# Patient Record
Sex: FEMALE | Race: WHITE | NOT HISPANIC OR LATINO | Employment: FULL TIME | URBAN - METROPOLITAN AREA
[De-identification: names, ages, dates, MRNs, and addresses within clinical notes are randomized per-mention and may not be internally consistent; named-entity substitution may affect disease eponyms.]

---

## 2017-02-22 ENCOUNTER — OFFICE VISIT (OUTPATIENT)
Dept: OBSTETRICS AND GYNECOLOGY | Facility: CLINIC | Age: 32
End: 2017-02-22

## 2017-02-22 VITALS
HEIGHT: 67 IN | WEIGHT: 183 LBS | DIASTOLIC BLOOD PRESSURE: 60 MMHG | BODY MASS INDEX: 28.72 KG/M2 | SYSTOLIC BLOOD PRESSURE: 110 MMHG

## 2017-02-22 DIAGNOSIS — Z13.9 SCREENING: ICD-10-CM

## 2017-02-22 DIAGNOSIS — Z80.3 FAMILY HISTORY OF BREAST CANCER: ICD-10-CM

## 2017-02-22 DIAGNOSIS — Z97.5 IUD (INTRAUTERINE DEVICE) IN PLACE: ICD-10-CM

## 2017-02-22 DIAGNOSIS — Z12.4 PAP SMEAR FOR CERVICAL CANCER SCREENING: ICD-10-CM

## 2017-02-22 DIAGNOSIS — Z01.419 WELL FEMALE EXAM WITH ROUTINE GYNECOLOGICAL EXAM: Primary | ICD-10-CM

## 2017-02-22 DIAGNOSIS — Z11.3 SCREEN FOR STD (SEXUALLY TRANSMITTED DISEASE): ICD-10-CM

## 2017-02-22 LAB
BILIRUB BLD-MCNC: NEGATIVE MG/DL
CLARITY, POC: CLEAR
COLOR UR: YELLOW
GLUCOSE UR STRIP-MCNC: NEGATIVE MG/DL
KETONES UR QL: NEGATIVE
LEUKOCYTE EST, POC: NEGATIVE
NITRITE UR-MCNC: NEGATIVE MG/ML
PH UR: 7 [PH] (ref 5–8)
PROT UR STRIP-MCNC: NEGATIVE MG/DL
RBC # UR STRIP: ABNORMAL /UL
SP GR UR: 1.02 (ref 1–1.03)
UROBILINOGEN UR QL: NORMAL

## 2017-02-22 PROCEDURE — 81002 URINALYSIS NONAUTO W/O SCOPE: CPT | Performed by: OBSTETRICS & GYNECOLOGY

## 2017-02-22 PROCEDURE — 99395 PREV VISIT EST AGE 18-39: CPT | Performed by: OBSTETRICS & GYNECOLOGY

## 2017-02-22 NOTE — PROGRESS NOTES
[facility]  Routine Annual Visit    2017    Patient: Bren Lynch          MR#:[id number]      History of Present Illness    32 y.o. female  who presents for annual exam.    Patient presents without complaints.  Alicia is expiring and patient is aware.  Patient's mother was diagnosed with breast cancer at age 58 and she desires testing for being a teen carrier    No LMP recorded (lmp unknown). Patient has had an implant.  Obstetric History:  OB History      Para Term  AB TAB SAB Ectopic Multiple Living    2 2 2       2         Menstrual History:  Menarche age: 11 years  No LMP recorded (lmp unknown). Patient has had an implant.  Period Cycle (Days): 30  Period Duration (Days): 5  Period Pattern: (!) Irregular  Menstrual Flow: Moderate  Menstrual Control: Maxi pad    Sexual History:       ________________________________________  Patient Active Problem List   Diagnosis   • Well female exam with routine gynecological exam   • IUD (intrauterine device) in place       History reviewed. No pertinent past medical history.    Past Surgical History   Procedure Laterality Date   •  section     •  section     • Tonsillectomy         History   Smoking Status   • Never Smoker   Smokeless Tobacco   • Never Used       Family History   Problem Relation Age of Onset   • Breast cancer Mother 58   • Hypertension Mother    • Hyperlipidemia Mother    • Other Mother      BLOOD CLOTS   • Liver cancer Mother    • Bone cancer Mother    • Stroke Maternal Grandfather    • Lung cancer Maternal Grandfather    • Emphysema Maternal Grandfather    • Uterine cancer Paternal Grandmother    • Stroke Paternal Grandfather    • Breast cancer Paternal Grandfather    • Hypertension Father    • Hyperlipidemia Father    • Osteoporosis Maternal Grandmother    • Cleft palate Cousin    • Mental retardation Cousin    • Von Willebrand disease Cousin    • Lung cancer Maternal Uncle 56   •  "Brain cancer Maternal Uncle 56       Prior to Admission medications    Not on File     ________________________________________    Current contraception: IUD  History of abnormal Pap smear: no  Family history of uterine or ovarian cancer: no  Family History of colon cancer/colon polyps: no  History of abnormal mammogram: no  History of abnormal lipids: no    The following portions of the patient's history were reviewed and updated as appropriate: allergies, current medications, past family history, past medical history, past social history, past surgical history and problem list.    Review of Systems    Pertinent items are noted in HPI.     Objective   Physical Exam    Visit Vitals   • /60   • Ht 66.5\" (168.9 cm)   • Wt 183 lb (83 kg)   • LMP  (LMP Unknown)   • Breastfeeding No   • BMI 29.09 kg/m2      BP Readings from Last 3 Encounters:   02/22/17 110/60      Wt Readings from Last 3 Encounters:   02/22/17 183 lb (83 kg)        BMI: Estimated body mass index is 29.09 kg/(m^2) as calculated from the following:    Height as of this encounter: 66.5\" (168.9 cm).    Weight as of this encounter: 183 lb (83 kg).      Is Bren Lynch ready to make a healthy lifestyle change today? Yes, readiness to change = 7    The priority health goal(s) that the family would like to work on are: eat more fruits and vegetables, decrease soda or juice intake, increase water intake, increase physical activity, reduce screen time, reduce portion size, cut out extra servings and reduce fast food intake    The family and Bren Lynch are at a Yes, readiness to change = 5 on the Confidence scale.        General:   alert, appears stated age and cooperative   Heart: regular rate and rhythm, S1, S2 normal, no murmur, click, rub or gallop   Lungs: clear to auscultation bilaterally   Abdomen: soft, non-tender, without masses or organomegaly   Breast: inspection negative, no nipple discharge or bleeding, no masses or nodularity palpable "   Vulva: normal   Vagina: normal mucosa   Cervix: no lesions and IUD strings are visible    Uterus: normal size   Adnexa: exam limited by habitus     Assessment:    1. Normal annual exam   2.  Family history of breast cancer-plan comprehensive carrier testing    Plan:    []  Rx:   []  Mammogram request made  [x]  PAP done  []  Occult fecal blood test ([facility])  []  Labs:   [x]  GC/Chl/TV  []  DEXA scan   []  Referral for colonoscopy:   []  Encouraged/discussed daily JASE Villalta MD  2/22/2017 11:28 AM

## 2017-02-24 ENCOUNTER — PROCEDURE VISIT (OUTPATIENT)
Dept: OBSTETRICS AND GYNECOLOGY | Facility: CLINIC | Age: 32
End: 2017-02-24

## 2017-02-24 VITALS
BODY MASS INDEX: 28.72 KG/M2 | WEIGHT: 183 LBS | DIASTOLIC BLOOD PRESSURE: 60 MMHG | SYSTOLIC BLOOD PRESSURE: 110 MMHG | HEIGHT: 67 IN

## 2017-02-24 DIAGNOSIS — Z13.9 SCREENING: ICD-10-CM

## 2017-02-24 DIAGNOSIS — Z30.430 ENCOUNTER FOR INSERTION OF INTRAUTERINE CONTRACEPTIVE DEVICE: Primary | ICD-10-CM

## 2017-02-24 LAB
B-HCG UR QL: NEGATIVE
C TRACH RRNA SPEC QL NAA+PROBE: NEGATIVE
INTERNAL NEGATIVE CONTROL: NEGATIVE
INTERNAL POSITIVE CONTROL: POSITIVE
Lab: NORMAL
N GONORRHOEA RRNA SPEC QL NAA+PROBE: NEGATIVE
T VAGINALIS RRNA SPEC QL NAA+PROBE: NEGATIVE

## 2017-02-24 PROCEDURE — 58301 REMOVE INTRAUTERINE DEVICE: CPT | Performed by: OBSTETRICS & GYNECOLOGY

## 2017-02-24 PROCEDURE — 81025 URINE PREGNANCY TEST: CPT | Performed by: OBSTETRICS & GYNECOLOGY

## 2017-02-24 PROCEDURE — 58300 INSERT INTRAUTERINE DEVICE: CPT | Performed by: OBSTETRICS & GYNECOLOGY

## 2017-02-24 NOTE — PROGRESS NOTES
IUD Removal Procedure Note    Type of IUD:  Alicia  Date of insertion:  known  Reason for removal:  Device expiration  Other relevant history/information:  none    Procedure Time Out Documentation      Procedure Details  IUD strings visible:  yes  Local anesthesia:  None  Tenaculum used:  None  Removal:  IUD strings grasped and IUD removed intact with gentle traction.  The patient tolerated the procedure well.    All appropriate instructions regarding removal were reviewed.    Plans for contraception:  IUD    Other follow-up needed:  none    The patient was advised to call for any fever or for prolonged or severe pain or bleeding. She was advised to use NSAID as needed for mild to moderate pain.       IUD Insertion Procedure Note    Pre-operative Diagnosis: desire LARC    Post-operative Diagnosis: same    Indications: contraception    Procedure Details   Urine pregnancy test was done  and result was neg.  The risks (including infection, bleeding, pain, and uterine perforation) and benefits of the procedure were explained to the patient and Written informed consent was obtained.      Cervix cleansed with Betadine. Uterus sounded to 6 cm. IUD inserted without difficulty. String visible and trimmed. Patient tolerated procedure well.    IUD Information:  Mirena, Lot # [id number], Expiration date 12/18.    Condition:  Stable    Complications:  None    Plan:    The patient was advised to call for any fever or for prolonged or severe pain or bleeding. She was advised to use NSAID as needed for mild to moderate pain.     Attending Physician Documentation:  I was present for or participated in the entire procedure.

## 2017-02-28 ENCOUNTER — TELEPHONE (OUTPATIENT)
Dept: OBSTETRICS AND GYNECOLOGY | Facility: CLINIC | Age: 32
End: 2017-02-28

## 2017-02-28 LAB
CYTOLOGIST CVX/VAG CYTO: NORMAL
CYTOLOGY CVX/VAG DOC THIN PREP: NORMAL
DX ICD CODE: NORMAL
HIV 1 & 2 AB SER-IMP: NORMAL
HPV I/H RISK 4 DNA CVX QL PROBE+SIG AMP: NEGATIVE
OTHER STN SPEC: NORMAL
PATH REPORT.FINAL DX SPEC: NORMAL
PATHOLOGIST CVX/VAG CYTO: NORMAL
STAT OF ADQ CVX/VAG CYTO-IMP: NORMAL

## 2017-02-28 NOTE — TELEPHONE ENCOUNTER
----- Message from Henrry Villalta MD sent at 2/28/2017  9:37 AM EST -----  Call pt:  PAP is negative.

## 2017-03-10 ENCOUNTER — TELEPHONE (OUTPATIENT)
Dept: OBSTETRICS AND GYNECOLOGY | Facility: CLINIC | Age: 32
End: 2017-03-10

## 2017-03-10 NOTE — TELEPHONE ENCOUNTER
Call pt and sent a copy of the original report.     Dev[employer] comprehensive screen does NOT identify any clinically significant mutation that would increase her risk of heredity breast cancer.      Henrry Villalta MD    ([place] to send copy to patient.)

## 2017-04-07 ENCOUNTER — OFFICE VISIT (OUTPATIENT)
Dept: OBSTETRICS AND GYNECOLOGY | Facility: CLINIC | Age: 32
End: 2017-04-07

## 2017-04-07 VITALS
WEIGHT: 183 LBS | DIASTOLIC BLOOD PRESSURE: 90 MMHG | BODY MASS INDEX: 29.41 KG/M2 | HEIGHT: 66 IN | SYSTOLIC BLOOD PRESSURE: 140 MMHG

## 2017-04-07 DIAGNOSIS — Z13.9 SCREENING: ICD-10-CM

## 2017-04-07 DIAGNOSIS — Z30.431 ENCOUNTER FOR ROUTINE CHECKING OF INTRAUTERINE CONTRACEPTIVE DEVICE: Primary | ICD-10-CM

## 2017-04-07 PROCEDURE — 99212 OFFICE O/P EST SF 10 MIN: CPT | Performed by: OBSTETRICS & GYNECOLOGY

## 2017-04-07 NOTE — PROGRESS NOTES
"[facility]     2017      Patient:  Bren Lynch   MR#:[id number]    Office note    CC: IUD check     Subjective     History of Present Illness  32 y.o. female  here today for IUD check.  She states having some occasional blood-tinged mucus in the strings are problematic.       Patient Active Problem List   Diagnosis   • Well female exam with routine gynecological exam   • IUD (intrauterine device) in place   • Family history of breast cancer       History reviewed. No pertinent past medical history.    Past Surgical History:   Procedure Laterality Date   •  SECTION     •  SECTION     • TONSILLECTOMY         The following portions of the patient's history were reviewed and updated as appropriate: allergies, current medications, past family history, past medical history, past social history, past surgical history and problem list.    Review of Systems    BP Readings from Last 3 Encounters:   17 140/90   17 110/60   17 110/60      Wt Readings from Last 3 Encounters:   17 183 lb (83 kg)   17 183 lb (83 kg)   17 183 lb (83 kg)      BMI: Estimated body mass index is 29.54 kg/(m^2) as calculated from the following:    Height as of this encounter: 66\" (167.6 cm).    Weight as of this encounter: 183 lb (83 kg).  BSA: Estimated body surface area is 1.93 meters squared as calculated from the following:    Height as of this encounter: 66\" (167.6 cm).    Weight as of this encounter: 183 lb (83 kg).    Objective   Physical Exam   Constitutional: She is oriented to person, place, and time. She appears well-developed and well-nourished.   Cardiovascular: Normal rate and regular rhythm.    Pulmonary/Chest: Effort normal and breath sounds normal.   Abdominal: Soft. Bowel sounds are normal. She exhibits no distension and no mass. There is no tenderness.   Genitourinary: Vagina normal and uterus normal.   Genitourinary Comments: IUD strings " trimmed    Neurological: She is alert and oriented to person, place, and time.   Psychiatric: She has a normal mood and affect. Her behavior is normal. Judgment and thought content normal.   Nursing note and vitals reviewed.        Assessment/Plan     1. IUD check       Henrry Villalta MD   4/7/2017 11:05 AM

## 2018-05-03 ENCOUNTER — OFFICE VISIT (OUTPATIENT)
Dept: OBSTETRICS AND GYNECOLOGY | Age: 33
End: 2018-05-03

## 2018-05-03 VITALS
DIASTOLIC BLOOD PRESSURE: 80 MMHG | WEIGHT: 183 LBS | SYSTOLIC BLOOD PRESSURE: 120 MMHG | HEIGHT: 67 IN | BODY MASS INDEX: 28.72 KG/M2

## 2018-05-03 DIAGNOSIS — Z01.419 WELL FEMALE EXAM WITH ROUTINE GYNECOLOGICAL EXAM: Primary | ICD-10-CM

## 2018-05-03 DIAGNOSIS — Z97.5 IUD (INTRAUTERINE DEVICE) IN PLACE: ICD-10-CM

## 2018-05-03 DIAGNOSIS — Z12.4 PAP SMEAR FOR CERVICAL CANCER SCREENING: ICD-10-CM

## 2018-05-03 DIAGNOSIS — Z13.89 SCREENING FOR BLOOD OR PROTEIN IN URINE: ICD-10-CM

## 2018-05-03 LAB
BILIRUB BLD-MCNC: NEGATIVE MG/DL
CLARITY, POC: CLEAR
COLOR UR: YELLOW
GLUCOSE UR STRIP-MCNC: NEGATIVE MG/DL
KETONES UR QL: NEGATIVE
LEUKOCYTE EST, POC: NEGATIVE
NITRITE UR-MCNC: NEGATIVE MG/ML
PH UR: 5.5 [PH] (ref 5–8)
PROT UR STRIP-MCNC: NEGATIVE MG/DL
RBC # UR STRIP: ABNORMAL /UL
SP GR UR: 1.02 (ref 1–1.03)
UROBILINOGEN UR QL: NORMAL

## 2018-05-03 PROCEDURE — 99395 PREV VISIT EST AGE 18-39: CPT | Performed by: OBSTETRICS & GYNECOLOGY

## 2018-05-03 PROCEDURE — 81002 URINALYSIS NONAUTO W/O SCOPE: CPT | Performed by: OBSTETRICS & GYNECOLOGY

## 2018-05-03 NOTE — PROGRESS NOTES
Routine Annual Visit    5/3/2018    Patient: Bren Lynch          MR#:[id number]      History of Present Illness    Chief Complaint   Patient presents with   • Gynecologic Exam     Annual.  Last pap 17, negative. no mammo no colonoscopy       33 y.o. female  who presents for annual exam.    The patient presents for routine annual exam feeling well without complaints today.  She does state she notices increased symptoms of urinary urgency over the past year but does not request any intervention at this time.    No LMP recorded (lmp unknown). Patient has had an implant.  Obstetric History:  OB History      Para Term  AB Living    2 2 2   2    SAB TAB Ectopic Molar Multiple Live Births         2         Menstrual History:     No LMP recorded (lmp unknown). Patient has had an implant.       Sexual History:       ________________________________________  Patient Active Problem List   Diagnosis   • Well female exam with routine gynecological exam   • IUD (intrauterine device) in place   • Family history of breast cancer       History reviewed. No pertinent past medical history.    Past Surgical History:   Procedure Laterality Date   •  SECTION     •  SECTION     • TONSILLECTOMY         History   Smoking Status   • Never Smoker   Smokeless Tobacco   • Never Used       Family History   Problem Relation Age of Onset   • Breast cancer Mother 58   • Hypertension Mother    • Hyperlipidemia Mother    • Other Mother      BLOOD CLOTS   • Liver cancer Mother 65   • Bone cancer Mother 65   • Stroke Maternal Grandfather    • Lung cancer Maternal Grandfather    • Emphysema Maternal Grandfather    • Uterine cancer Paternal Grandmother 60   • Stroke Paternal Grandfather    • Prostate cancer Paternal Grandfather    • Hypertension Father    • Hyperlipidemia Father    • Osteoporosis Maternal Grandmother    • Cleft palate Cousin    • Mental retardation Cousin    • Von Willebrand  "disease Cousin    • Lung cancer Maternal Uncle 56   • Brain cancer Maternal Uncle 56   • Bone cancer Maternal Uncle 56   • Ovarian cancer Neg Hx    • Colon cancer Neg Hx    • Melanoma Neg Hx        Prior to Admission medications    Not on File     ________________________________________    Current contraception: IUD  History of abnormal Pap smear: no  Family history of uterine or ovarian cancer: no  Family History of colon cancer/colon polyps: no  History of abnormal mammogram: no  History of abnormal lipids: no    The following portions of the patient's history were reviewed and updated as appropriate: allergies, current medications, past family history, past medical history, past social history, past surgical history and problem list.    Review of Systems    Pertinent items are noted in HPI.     Objective   Physical Exam    /80   Ht 168.9 cm (66.5\")   Wt 83 kg (183 lb)   LMP  (LMP Unknown)   Breastfeeding? No   BMI 29.09 kg/m²    BP Readings from Last 3 Encounters:   05/03/18 120/80   04/07/17 140/90   02/24/17 110/60      Wt Readings from Last 3 Encounters:   05/03/18 83 kg (183 lb)   04/07/17 83 kg (183 lb)   02/24/17 83 kg (183 lb)        BMI: Estimated body mass index is 29.09 kg/m² as calculated from the following:    Height as of this encounter: 168.9 cm (66.5\").    Weight as of this encounter: 83 kg (183 lb).    General:   alert, appears stated age and cooperative   Heart: regular rate and rhythm, S1, S2 normal, no murmur, click, rub or gallop   Lungs: clear to auscultation bilaterally   Abdomen: soft, non-tender, without masses or organomegaly   Breast: inspection negative, no nipple discharge or bleeding, no masses or nodularity palpable   Vulva: normal   Vagina: normal mucosa   Cervix: no lesions and IUD strings visible 2 cm deviated to the left   Uterus: normal size   Adnexa: normal adnexa     As part of wellness and prevention, the following topics were discussed with the patient:     []  " Nutrition  [x]  Physical activity/regular exercise   []  Healthy weight  []  Injury prevention  []  Smoking cessation  []  Substance misuse/abuse  []  Sexual behavior  []  STD prevention  [x]  Contaception  []  Dental health  []  Mental health  []  Immunization  [x]  Encouraged SBE       Assessment:    Bren was seen today for gynecologic exam.    Diagnoses and all orders for this visit:    Well female exam with routine gynecological exam    Screening for blood or protein in urine  -     POC Urinalysis Dipstick    Pap smear for cervical cancer screening  -     IgP, Aptima HPV - ThinPrep Vial, Cervix    IUD (intrauterine device) in place          Plan:  Return in about 1 year (around 5/3/2019) for Annual GYN exam.      Henrry Villalta MD  5/3/2018 9:07 AM

## 2018-05-07 LAB
CYTOLOGIST CVX/VAG CYTO: NORMAL
CYTOLOGY CVX/VAG DOC THIN PREP: NORMAL
DX ICD CODE: NORMAL
HIV 1 & 2 AB SER-IMP: NORMAL
HPV I/H RISK 4 DNA CVX QL PROBE+SIG AMP: NEGATIVE
OTHER STN SPEC: NORMAL
PATH REPORT.FINAL DX SPEC: NORMAL
STAT OF ADQ CVX/VAG CYTO-IMP: NORMAL

## 2018-05-08 ENCOUNTER — TELEPHONE (OUTPATIENT)
Dept: OBSTETRICS AND GYNECOLOGY | Age: 33
End: 2018-05-08

## 2018-05-08 NOTE — TELEPHONE ENCOUNTER
----- Message from Henrry Villalta MD sent at 5/7/2018  8:11 PM EDT -----  Call pt:  PAP is negative.

## 2019-05-31 ENCOUNTER — OFFICE VISIT (OUTPATIENT)
Dept: OBSTETRICS AND GYNECOLOGY | Age: 34
End: 2019-05-31

## 2019-05-31 VITALS
HEIGHT: 66 IN | BODY MASS INDEX: 30.57 KG/M2 | SYSTOLIC BLOOD PRESSURE: 118 MMHG | WEIGHT: 190.2 LBS | DIASTOLIC BLOOD PRESSURE: 78 MMHG

## 2019-05-31 DIAGNOSIS — Z80.3 FAMILY HISTORY OF BREAST CANCER: ICD-10-CM

## 2019-05-31 DIAGNOSIS — Z12.31 SCREENING MAMMOGRAM, ENCOUNTER FOR: ICD-10-CM

## 2019-05-31 DIAGNOSIS — Z01.419 WELL WOMAN EXAM WITH ROUTINE GYNECOLOGICAL EXAM: Primary | ICD-10-CM

## 2019-05-31 PROCEDURE — 99395 PREV VISIT EST AGE 18-39: CPT | Performed by: NURSE PRACTITIONER

## 2019-05-31 RX ORDER — FEXOFENADINE HCL 180 MG/1
180 TABLET ORAL AS NEEDED
COMMUNITY
End: 2022-05-25

## 2019-05-31 NOTE — PROGRESS NOTES
[facility]  Routine Annual Visit    2019    Patient: Bren Lynch          MR#:[id number]      History of Present Illness    34 y.o. female  who presents for annual exam. Last pap negative HPV negative 2018. Bren is using IUD for contraception- inserted 2017. She reports amenorrhea on the IUD and has no complaints or concerns. She denies new medical hx. We discussed her family hx breast cancer. Bren reports she has had negative BRCA testing. Baseline mammogram encouraged and Bren would like to proceed with this. Order placed. She has no breast complaints today and reports she does do periodic self breast exams. No complaints today.      No LMP recorded (lmp unknown). Patient has had an implant.  Obstetric History:  OB History      Para Term  AB Living    2 2 2     2    SAB TAB Ectopic Molar Multiple Live Births              2         Menstrual History:     No LMP recorded (lmp unknown). Patient has had an implant.       Sexual History:       ________________________________________  Patient Active Problem List   Diagnosis   • Well female exam with routine gynecological exam   • IUD (intrauterine device) in place   • Family history of breast cancer       History reviewed. No pertinent past medical history.    Past Surgical History:   Procedure Laterality Date   •  SECTION     •  SECTION     • TONSILLECTOMY         Social History     Tobacco Use   Smoking Status Never Smoker   Smokeless Tobacco Never Used       Family History   Problem Relation Age of Onset   • Breast cancer Mother 58   • Hypertension Mother    • Hyperlipidemia Mother    • Other Mother         BLOOD CLOTS   • Liver cancer Mother 65   • Bone cancer Mother 65   • Stroke Maternal Grandfather    • Lung cancer Maternal Grandfather    • Emphysema Maternal Grandfather    • Uterine cancer Paternal Grandmother 60   • Stroke Paternal Grandfather    • Prostate cancer Paternal  "Grandfather    • Hypertension Father    • Hyperlipidemia Father    • Osteoporosis Maternal Grandmother    • Cleft palate Cousin    • Mental retardation Cousin    • Von Willebrand disease Cousin    • Lung cancer Maternal Uncle 56   • Brain cancer Maternal Uncle 56   • Bone cancer Maternal Uncle 56   • Ovarian cancer Neg Hx    • Colon cancer Neg Hx    • Melanoma Neg Hx        Prior to Admission medications    Medication Sig Start Date End Date Taking? Authorizing Provider   fexofenadine (ALLEGRA) 180 MG tablet Take 180 mg by mouth Daily.   Yes Provider, Marie, MD     ________________________________________    The following portions of the patient's history were reviewed and updated as appropriate: allergies, current medications, past family history, past medical history, past social history, past surgical history and problem list.    Review of Systems   Constitutional: Negative.    HENT: Negative.    Eyes: Negative for visual disturbance.   Respiratory: Negative for cough, shortness of breath and wheezing.    Cardiovascular: Negative for chest pain, palpitations and leg swelling.   Gastrointestinal: Negative for abdominal distention, abdominal pain, blood in stool, constipation, diarrhea, nausea and vomiting.   Endocrine: Negative for cold intolerance and heat intolerance.   Genitourinary: Negative for difficulty urinating, dyspareunia, dysuria, frequency, genital sores, hematuria, menstrual problem, pelvic pain, urgency, vaginal bleeding, vaginal discharge and vaginal pain.   Musculoskeletal: Negative.    Skin: Negative.    Neurological: Negative for dizziness, weakness, light-headedness, numbness and headaches.   Hematological: Negative.    Psychiatric/Behavioral: Negative.    Breasts: negative for lumps skin changes, dimpling, swelling, nipple changes/discharge bilaterally       Objective   Physical Exam    /78   Ht 167.6 cm (66\")   Wt 86.3 kg (190 lb 3.2 oz)   LMP  (LMP Unknown) Comment: its been " "about 6 months since last cycle  Breastfeeding? No   BMI 30.70 kg/m²    BP Readings from Last 3 Encounters:   05/31/19 118/78   05/03/18 120/80   04/07/17 140/90      Wt Readings from Last 3 Encounters:   05/31/19 86.3 kg (190 lb 3.2 oz)   05/03/18 83 kg (183 lb)   04/07/17 83 kg (183 lb)        BMI: Estimated body mass index is 30.7 kg/m² as calculated from the following:    Height as of this encounter: 167.6 cm (66\").    Weight as of this encounter: 86.3 kg (190 lb 3.2 oz).        General:   alert, appears stated age and cooperative   Heart: regular rate and rhythm, S1, S2 normal, no murmur, click, rub or gallop   Lungs: clear to auscultation bilaterally   Abdomen: soft, non-tender, without masses or organomegaly   Breast: inspection negative, no nipple discharge or bleeding, no masses or nodularity palpable   Vulva: normal   Vagina: normal mucosa, normal discharge   Cervix: no cervical motion tenderness, no lesions and strings appear 1 cm    Uterus: normal size, mobile, non-tender or normal shape and consistency   Adnexa: no mass, fullness, tenderness     Assessment:    1. Normal annual exam   2. Healthy lifestyle modifications discussed, counseled on self breast exams and bone health      Plan:    []  Rx:   [x]  Mammogram request made  [x]  PAP done  []  Occult fecal blood test ([facility])  []  Labs:   []  GC/Chl/TV  []  DEXA scan   []  Referral for colonoscopy:           KESHIA Gannon  5/31/2019 9:51 AM  "

## 2019-06-04 ENCOUNTER — TELEPHONE (OUTPATIENT)
Dept: OBSTETRICS AND GYNECOLOGY | Age: 34
End: 2019-06-04

## 2019-06-04 LAB
CYTOLOGIST CVX/VAG CYTO: NORMAL
CYTOLOGY CVX/VAG DOC CYTO: NORMAL
CYTOLOGY CVX/VAG DOC THIN PREP: NORMAL
DX ICD CODE: NORMAL
HIV 1 & 2 AB SER-IMP: NORMAL
HPV I/H RISK 4 DNA CVX QL PROBE+SIG AMP: NEGATIVE
OTHER STN SPEC: NORMAL
STAT OF ADQ CVX/VAG CYTO-IMP: NORMAL

## 2019-06-04 NOTE — TELEPHONE ENCOUNTER
----- Message from KESHIA Vo sent at 6/4/2019 10:37 AM EDT -----  Please inform patient her pap smear returned negative (normal). Thank you.

## 2019-06-05 ENCOUNTER — APPOINTMENT (OUTPATIENT)
Dept: MAMMOGRAPHY | Facility: HOSPITAL | Age: 34
End: 2019-06-05

## 2019-06-11 ENCOUNTER — HOSPITAL ENCOUNTER (OUTPATIENT)
Dept: MAMMOGRAPHY | Facility: HOSPITAL | Age: 34
Discharge: HOME | End: 2019-06-11
Admitting: NURSE PRACTITIONER

## 2019-06-11 DIAGNOSIS — Z12.31 SCREENING MAMMOGRAM, ENCOUNTER FOR: ICD-10-CM

## 2019-06-11 DIAGNOSIS — Z80.3 FAMILY HISTORY OF BREAST CANCER: ICD-10-CM

## 2019-06-11 PROCEDURE — 77067 SCR MAMMO BI INCL CAD: CPT

## 2019-06-12 ENCOUNTER — TELEPHONE (OUTPATIENT)
Dept: OBSTETRICS AND GYNECOLOGY | Age: 34
End: 2019-06-12

## 2019-06-12 NOTE — TELEPHONE ENCOUNTER
----- Message from KESHIA Vo sent at 6/12/2019  8:48 AM EDT -----  Please call pt and notify of normal mammogram results. Repeat 1 year.

## 2020-01-02 ENCOUNTER — OFFICE VISIT (OUTPATIENT)
Dept: FAMILY MEDICINE CLINIC | Facility: CLINIC | Age: 35
End: 2020-01-02

## 2020-01-02 VITALS
OXYGEN SATURATION: 98 % | DIASTOLIC BLOOD PRESSURE: 74 MMHG | BODY MASS INDEX: 29.7 KG/M2 | HEART RATE: 98 BPM | SYSTOLIC BLOOD PRESSURE: 118 MMHG | TEMPERATURE: 98 F | WEIGHT: 184.8 LBS | HEIGHT: 66 IN

## 2020-01-02 DIAGNOSIS — Z23 NEED FOR DTAP VACCINE: ICD-10-CM

## 2020-01-02 DIAGNOSIS — R63.5 WEIGHT GAIN: ICD-10-CM

## 2020-01-02 DIAGNOSIS — Z13.220 SCREENING FOR LIPOID DISORDERS: ICD-10-CM

## 2020-01-02 DIAGNOSIS — Z00.00 ROUTINE HEALTH MAINTENANCE: Primary | ICD-10-CM

## 2020-01-02 DIAGNOSIS — Z13.1 SCREENING FOR DIABETES MELLITUS: ICD-10-CM

## 2020-01-02 PROCEDURE — 99385 PREV VISIT NEW AGE 18-39: CPT | Performed by: FAMILY MEDICINE

## 2020-01-02 PROCEDURE — 90715 TDAP VACCINE 7 YRS/> IM: CPT | Performed by: FAMILY MEDICINE

## 2020-01-02 PROCEDURE — 90471 IMMUNIZATION ADMIN: CPT | Performed by: FAMILY MEDICINE

## 2020-01-02 NOTE — PROGRESS NOTES
Subjective   Bren Lynch is a 34 y.o. female who presents for annual female wellness exam.  Chief Complaint   Patient presents with   • Establish Care     Patient presents to establish as a new patient.  She has not seen a family physician for some time.  She is up-to-date with her gynecologic care.  She has no complaints today so we will do a well physical.    Menstrual History: menarche at, h/o irregular  Pregnancy History: , 8 and 5, both C/S  Sexual History: yes with    Contraception: mirena  Hormone Replacement Therapy: na  Diet: standard  Exercise: [place], [place]  Do you feel safe? yes  Have you ever been abused? no    Mammogram: normal 2019  Pap Smear: 2019  Bone Density: na  Colon Cancer Screening: na    Immunization History   Administered Date(s) Administered   • Flu Vaccine Intradermal Quad 18-64YR 10/16/2019   • Tdap 2020       The following portions of the patient's history were reviewed and updated as appropriate: allergies, current medications, past family history, past medical history, past social history, past surgical history and problem list.    History reviewed. No pertinent past medical history.    Past Surgical History:   Procedure Laterality Date   •  SECTION     •  SECTION     • TONSILLECTOMY         Family History   Problem Relation Age of Onset   • Breast cancer Mother 58   • Hypertension Mother    • Hyperlipidemia Mother    • Other Mother         BLOOD CLOTS   • Liver cancer Mother 65   • Bone cancer Mother 65   • Stroke Maternal Grandfather    • Lung cancer Maternal Grandfather    • Emphysema Maternal Grandfather    • Uterine cancer Paternal Grandmother 60   • Stroke Paternal Grandfather    • Prostate cancer Paternal Grandfather    • Hypertension Father    • Hyperlipidemia Father    • Osteoporosis Maternal Grandmother    • Cleft palate Cousin    • Mental retardation Cousin    • Von Willebrand disease Cousin    • Lung cancer Maternal  Uncle 56   • Brain cancer Maternal Uncle 56   • Bone cancer Maternal Uncle 56   • Ovarian cancer Neg Hx    • Colon cancer Neg Hx    • Melanoma Neg Hx        Social History     Socioeconomic History   • Marital status:      Spouse name: MARIA D   • Number of children: 2   • Years of education: 12   • Highest education level: Not on file   Occupational History   • Occupation: HOMEMAKER   Tobacco Use   • Smoking status: Never Smoker   • Smokeless tobacco: Never Used   Substance and Sexual Activity   • Alcohol use: No   • Drug use: No   • Sexual activity: Yes     Partners: Male     Birth control/protection: IUD     Comment: Mirena 2/2017         Current Outpatient Medications:   •  fexofenadine (ALLEGRA) 180 MG tablet, Take 180 mg by mouth As Needed., Disp: , Rfl:   •  levonorgestrel (MIRENA, 52 MG,) 20 MCG/24HR IUD, 1 each by Intrauterine route 1 (One) Time., Disp: , Rfl:     Review of Systems   Constitutional: Negative for chills, fatigue and fever.   HENT: Negative for congestion, ear pain, rhinorrhea and sore throat.    Eyes: Negative for pain and redness.   Respiratory: Negative for cough, chest tightness, shortness of breath and wheezing.    Cardiovascular: Negative for chest pain and leg swelling.   Gastrointestinal: Negative for abdominal pain, constipation, diarrhea, nausea and vomiting.   Endocrine: Negative for polydipsia, polyphagia and polyuria.   Genitourinary: Negative for dysuria and hematuria.   Musculoskeletal: Negative for arthralgias and myalgias.   Skin: Negative for color change and rash.   Allergic/Immunologic: Negative.    Neurological: Negative for dizziness, weakness, light-headedness and headaches.   Hematological: Negative.  Does not bruise/bleed easily.   Psychiatric/Behavioral: Negative for confusion, dysphoric mood and sleep disturbance.       Objective   Vitals:    01/02/20 0927   BP: 118/74   Pulse: 98   Temp: 98 °F (36.7 °C)   SpO2: 98%   Weight: 83.8 kg (184 lb 12.8 oz)   Height:  "167.6 cm (66\")     Body mass index is 29.83 kg/m².  Physical Exam   Constitutional: She is oriented to person, place, and time. She appears well-developed and well-nourished.   HENT:   Head: Normocephalic and atraumatic.   Eyes: Pupils are equal, round, and reactive to light. Conjunctivae and EOM are normal.   Neck: Neck supple. No thyromegaly present.   Cardiovascular: Normal rate and regular rhythm.   No murmur heard.  Pulmonary/Chest: Effort normal and breath sounds normal. She has no wheezes.   Abdominal: Soft. Bowel sounds are normal.   Musculoskeletal: Normal range of motion.   Neurological: She is alert and oriented to person, place, and time. No cranial nerve deficit.   Skin: Skin is warm and dry.   Psychiatric: She has a normal mood and affect.         Assessment/Plan   Bren was seen today for establish care.    Diagnoses and all orders for this visit:    Routine health maintenance    BMI 29.0-29.9,adult    Screening for diabetes mellitus  -     Comprehensive Metabolic Panel    Screening for lipoid disorders  -     Lipid Panel    Weight gain  -     CBC & Differential  -     TSH    Need for DTaP vaccine  -     Tdap Vaccine Greater Than or Equal To 8yo IM               Discussed the importance of maintaining a healthy weight and getting regular exercise.  Educated patient on the benefits of healthy diet.  Advise follow-up annually for wellness exams.    There are no Patient Instructions on file for this visit.      "

## 2020-01-03 LAB
ALBUMIN SERPL-MCNC: 4.4 G/DL (ref 3.5–5.2)
ALBUMIN/GLOB SERPL: 1.3 G/DL
ALP SERPL-CCNC: 71 U/L (ref 39–117)
ALT SERPL-CCNC: 44 U/L (ref 1–33)
AST SERPL-CCNC: 28 U/L (ref 1–32)
BASOPHILS # BLD AUTO: 0.04 10*3/MM3 (ref 0–0.2)
BASOPHILS NFR BLD AUTO: 0.7 % (ref 0–1.5)
BILIRUB SERPL-MCNC: 0.4 MG/DL (ref 0.2–1.2)
BUN SERPL-MCNC: 11 MG/DL (ref 6–20)
BUN/CREAT SERPL: 12.8 (ref 7–25)
CALCIUM SERPL-MCNC: 9.4 MG/DL (ref 8.6–10.5)
CHLORIDE SERPL-SCNC: 103 MMOL/L (ref 98–107)
CHOLEST SERPL-MCNC: 214 MG/DL (ref 0–200)
CO2 SERPL-SCNC: 22.6 MMOL/L (ref 22–29)
CREAT SERPL-MCNC: 0.86 MG/DL (ref 0.57–1)
EOSINOPHIL # BLD AUTO: 0.06 10*3/MM3 (ref 0–0.4)
EOSINOPHIL NFR BLD AUTO: 1.1 % (ref 0.3–6.2)
ERYTHROCYTE [DISTWIDTH] IN BLOOD BY AUTOMATED COUNT: 11.8 % (ref 12.3–15.4)
GLOBULIN SER CALC-MCNC: 3.3 GM/DL
GLUCOSE SERPL-MCNC: 92 MG/DL (ref 65–99)
HCT VFR BLD AUTO: 44.9 % (ref 34–46.6)
HDLC SERPL-MCNC: 49 MG/DL (ref 40–60)
HGB BLD-MCNC: 15.1 G/DL (ref 12–15.9)
IMM GRANULOCYTES # BLD AUTO: 0.02 10*3/MM3 (ref 0–0.05)
IMM GRANULOCYTES NFR BLD AUTO: 0.4 % (ref 0–0.5)
LDLC SERPL CALC-MCNC: 147 MG/DL (ref 0–100)
LYMPHOCYTES # BLD AUTO: 1.62 10*3/MM3 (ref 0.7–3.1)
LYMPHOCYTES NFR BLD AUTO: 28.5 % (ref 19.6–45.3)
MCH RBC QN AUTO: 29.7 PG (ref 26.6–33)
MCHC RBC AUTO-ENTMCNC: 33.6 G/DL (ref 31.5–35.7)
MCV RBC AUTO: 88.4 FL (ref 79–97)
MONOCYTES # BLD AUTO: 0.44 10*3/MM3 (ref 0.1–0.9)
MONOCYTES NFR BLD AUTO: 7.7 % (ref 5–12)
NEUTROPHILS # BLD AUTO: 3.51 10*3/MM3 (ref 1.7–7)
NEUTROPHILS NFR BLD AUTO: 61.6 % (ref 42.7–76)
NRBC BLD AUTO-RTO: 0 /100 WBC (ref 0–0.2)
PLATELET # BLD AUTO: 226 10*3/MM3 (ref 140–450)
POTASSIUM SERPL-SCNC: 4.8 MMOL/L (ref 3.5–5.2)
PROT SERPL-MCNC: 7.7 G/DL (ref 6–8.5)
RBC # BLD AUTO: 5.08 10*6/MM3 (ref 3.77–5.28)
SODIUM SERPL-SCNC: 143 MMOL/L (ref 136–145)
TRIGL SERPL-MCNC: 90 MG/DL (ref 0–150)
TSH SERPL DL<=0.005 MIU/L-ACNC: 1.54 UIU/ML (ref 0.27–4.2)
VLDLC SERPL CALC-MCNC: 18 MG/DL
WBC # BLD AUTO: 5.69 10*3/MM3 (ref 3.4–10.8)

## 2020-07-03 NOTE — ADDENDUM NOTE
Addended by: YAMILA WISE on: 2/22/2017 11:47 AM     Modules accepted: Orders     INFECTIOUS DISEASE SERVICE INITIAL CONSULTATION NOTE    HPI:  88yo F hx L breast CA on Letrozole, aortic stenosis s/p TAVR, afib on coumadin, htn, hld, asthma p/w generalized weakness, worsening AARON, and fever tmax 102 x 3 days. Patient to see PMD and was told she has PNA, sent to ED for further management.  Patient denies cough, cp, abd pain, n/v/d, back pain, leg pain/swelling, sick/COVID contact. (02 Jul 2020 22:11)    ID consulted for fevers.   The patient reports that she started having fevers as high as 102 on Wednesday. Has had more coughing than usual. Had dental work about 3 weeks ago, was told she had an impacted tooth with infection and was treated with amoxicillin. No antibiotics before the dental work.     PAST MEDICAL & SURGICAL HISTORY:  Morbid obesity  Aortic stenosis: moderate  Atrial fibrillation  Spinal stenosis  HLD (hyperlipidemia)  HTN (hypertension)  Asthma  History of cataract surgery, left  Lipoma  Hx of hysterectomy      REVIEW OF SYSTEMS:  Constitutional: no weakness, +fevers, +chills  Dermatologic: no rash  Respiratory: +SOB, +cough  Cardiovascular: no chest pain, +palpitations  Gastrointestinal: no nausea, no vomiting, no diarrhea  Genitourinary: no dysuria, no urinary frequency, no hematuria, no urinary retention  Musculoskeletal:	no weakness, no joint swelling/pain  Neurological: no focal weakness or numbness  Endocrine: no polyuria, no polydipsia    ACTIVE ANTIMICROBIAL/ANTIBIOTIC MEDICATIONS:      OTHER MEDICATIONS:  albuterol/ipratropium for Nebulization 3 milliLiter(s) Nebulizer every 6 hours  aspirin enteric coated 81 milliGRAM(s) Oral daily  diltiazem    milliGRAM(s) Oral daily  ferrous    sulfate 325 milliGRAM(s) Oral daily  folic acid 1 milliGRAM(s) Oral daily  furosemide   Injectable 40 milliGRAM(s) IV Push two times a day  isosorbide   mononitrate ER Tablet (IMDUR) 30 milliGRAM(s) Oral daily  letrozole 2.5 milliGRAM(s) Oral daily  metoprolol tartrate 50 milliGRAM(s) Oral two times a day  multivitamin/minerals 1 Tablet(s) Oral daily  sertraline 100 milliGRAM(s) Oral daily  warfarin 2 milliGRAM(s) Oral once      ALLERGIES:  Allergies    caffeine (Unknown)  menthol topical (Unknown)  Originally Entered as [Unknown] reaction to [MENTHOL] (Unknown)  sulfa (Unknown)  sulfa drugs (Unknown)    Intolerances        SOCIAL HISTORY: Lives alone, daughter lives 4 blocks away. Never smoker, no alcohol, no drugs.     FAMILY HISTORY:  No pertinent family history in first degree relatives      VITAL SIGNS:  ICU Vital Signs Last 24 Hrs  T(C): 36.9 (03 Jul 2020 04:10), Max: 37.8 (02 Jul 2020 15:35)  T(F): 98.5 (03 Jul 2020 04:10), Max: 100.1 (02 Jul 2020 15:35)  HR: 79 (03 Jul 2020 04:10) (67 - 100)  BP: 112/70 (03 Jul 2020 04:10) (94/66 - 114/59)  BP(mean): --  ABP: --  ABP(mean): --  RR: 18 (03 Jul 2020 07:15) (17 - 20)  SpO2: 99% (03 Jul 2020 07:15) (93% - 99%)      PHYSICAL EXAM:  Constitutional: Elderly woman sitting comfortably on bedside   Head: NC/AT  Eyes: anicteric sclera  ENMT: no rhinorrhea; no sinus tenderness on palpation; no oropharyngeal lesions, erythema, or exudates, poor dentition 	  Neck: supple; no JVD or LAD  Respiratory: Decreased breath sounds  Cardiovascular: Distant heart sounds  Gastrointestinal: soft, NT/ND; +BS  Extremities: WWP; no clubbing, cyanosis, or edema  Vascular: peripheral IV, no phlebitis   Dermatologic: hyperpigmentation of the LEs bilaterally   Neurologic: AAOx3; no focal deficits    LABS:                        12.3   5.67  )-----------( 115      ( 03 Jul 2020 06:45 )             38.9     07-03    138  |  98  |  34<H>  ----------------------------<  101<H>  3.8   |  28  |  1.13    Ca    9.2      03 Jul 2020 06:45    TPro  7.1  /  Alb  4.1  /  TBili  0.6  /  DBili  x   /  AST  16  /  ALT  9<L>  /  AlkPhos  58  07-02    PT/INR - ( 03 Jul 2020 08:47 )   PT: 31.6 sec;   INR: 2.81 ratio         PTT - ( 02 Jul 2020 15:15 )  PTT:40.1 sec      MICROBIOLOGY:      RADIOLOGY & ADDITIONAL STUDIES: INFECTIOUS DISEASE SERVICE INITIAL CONSULTATION NOTE    HPI:  88yo F hx L breast CA on Letrozole, aortic stenosis s/p TAVR, afib on coumadin, htn, hld, asthma p/w generalized weakness, worsening AARON, and fever tmax 102 x 3 days. Patient to see PMD and was told she has PNA, sent to ED for further management.  Patient denies cough, cp, abd pain, n/v/d, back pain, leg pain/swelling, sick/COVID contact. (02 Jul 2020 22:11)    ID consulted for fevers.   The patient reports that she started having fevers as high as 102 on Wednesday. Has had more coughing than usual. Had dental work about 3 weeks ago, was told she had an impacted tooth with infection and was treated with amoxicillin. No antibiotics before the dental work.     PAST MEDICAL & SURGICAL HISTORY:  Morbid obesity  Aortic stenosis: moderate  Atrial fibrillation  Spinal stenosis  HLD (hyperlipidemia)  HTN (hypertension)  Asthma  History of cataract surgery, left  Lipoma  Hx of hysterectomy      REVIEW OF SYSTEMS:  Constitutional: no weakness, +fevers, +chills  Dermatologic: no rash  Respiratory: +SOB, +cough  Cardiovascular: no chest pain, +palpitations  Gastrointestinal: no nausea, no vomiting, no diarrhea  Genitourinary: no dysuria, no urinary frequency, no hematuria, no urinary retention  Musculoskeletal:	no weakness, no joint swelling/pain  Neurological: no focal weakness or numbness  Endocrine: no polyuria, no polydipsia    ACTIVE ANTIMICROBIAL/ANTIBIOTIC MEDICATIONS:      OTHER MEDICATIONS:  albuterol/ipratropium for Nebulization 3 milliLiter(s) Nebulizer every 6 hours  aspirin enteric coated 81 milliGRAM(s) Oral daily  diltiazem    milliGRAM(s) Oral daily  ferrous    sulfate 325 milliGRAM(s) Oral daily  folic acid 1 milliGRAM(s) Oral daily  furosemide   Injectable 40 milliGRAM(s) IV Push two times a day  isosorbide   mononitrate ER Tablet (IMDUR) 30 milliGRAM(s) Oral daily  letrozole 2.5 milliGRAM(s) Oral daily  metoprolol tartrate 50 milliGRAM(s) Oral two times a day  multivitamin/minerals 1 Tablet(s) Oral daily  sertraline 100 milliGRAM(s) Oral daily  warfarin 2 milliGRAM(s) Oral once      ALLERGIES:  Allergies    caffeine (Unknown)  menthol topical (Unknown)  Originally Entered as [Unknown] reaction to [MENTHOL] (Unknown)  sulfa (Unknown)  sulfa drugs (Unknown)    Intolerances        SOCIAL HISTORY: Lives alone, daughter lives 4 blocks away. Never smoker, no alcohol, no drugs.     FAMILY HISTORY:  No pertinent family history in first degree relatives      VITAL SIGNS:  ICU Vital Signs Last 24 Hrs  T(C): 36.9 (03 Jul 2020 04:10), Max: 37.8 (02 Jul 2020 15:35)  T(F): 98.5 (03 Jul 2020 04:10), Max: 100.1 (02 Jul 2020 15:35)  HR: 79 (03 Jul 2020 04:10) (67 - 100)  BP: 112/70 (03 Jul 2020 04:10) (94/66 - 114/59)  BP(mean): --  ABP: --  ABP(mean): --  RR: 18 (03 Jul 2020 07:15) (17 - 20)  SpO2: 99% (03 Jul 2020 07:15) (93% - 99%)      PHYSICAL EXAM:  Constitutional: Elderly woman sitting comfortably on bedside   Head: NC/AT  Eyes: anicteric sclera  ENMT: no rhinorrhea; no sinus tenderness on palpation; no oropharyngeal lesions, erythema, or exudates, poor dentition 	  Neck: supple; no JVD or LAD  Respiratory: Decreased breath sounds  Cardiovascular: Distant heart sounds  Gastrointestinal: soft, NT/ND; +BS  Extremities: WWP; no clubbing, cyanosis, or edema  Vascular: peripheral IV, no phlebitis   Dermatologic: hyperpigmentation of the LEs bilaterally   Neurologic: AAOx3; no focal deficits    LABS:                        12.3   5.67  )-----------( 115      ( 03 Jul 2020 06:45 )             38.9     07-03    138  |  98  |  34<H>  ----------------------------<  101<H>  3.8   |  28  |  1.13    Ca    9.2      03 Jul 2020 06:45    TPro  7.1  /  Alb  4.1  /  TBili  0.6  /  DBili  x   /  AST  16  /  ALT  9<L>  /  AlkPhos  58  07-02    PT/INR - ( 03 Jul 2020 08:47 )   PT: 31.6 sec;   INR: 2.81 ratio         PTT - ( 02 Jul 2020 15:15 )  PTT:40.1 sec      MICROBIOLOGY:  Blood cultures pending in lab     COVID-19 PCR . (07.02.20 @ 15:15)    COVID-19 PCR: NotDetec:    RADIOLOGY & ADDITIONAL STUDIES:    < from: CT Angio Chest w/ IV Cont (07.02.20 @ 18:55) >  IMPRESSION:   No pulmonary embolus in the main pulmonary artery, the right pulmonary artery, the left pulmonary artery or the segmental pulmonary arteries. Evaluation of some of the subsegmental pulmonary arteries is limited secondary to poor opacification and motion artifact.    Redemonstrated aremultiple thyroid lesions, largest measuring 2.4 x 1.2 cm, previously measuring 1.9 x 1.2 cm when remeasured on 1/8/2018. Heterogeneous thyroid with multiple thyroid nodules, the largest measuring 2 x 1.2 cm, slightly enlarged from prior.    Indeterminate left upper quadrant 4 cm soft tissue density lesion between the left adrenal gland and stomach is enlarged and the prior study when it measured 3.5 cm.    < end of copied text > INFECTIOUS DISEASE SERVICE INITIAL CONSULTATION NOTE    HPI:  88yo F hx L breast CA on Letrozole, aortic stenosis s/p TAVR, afib on coumadin, htn, hld, asthma p/w generalized weakness, worsening AARON, and fever tmax 102 x 3 days. Patient to see PMD and was told she has PNA, sent to ED for further management.  Patient denies cough, cp, abd pain, n/v/d, back pain, leg pain/swelling, sick/COVID contact. (02 Jul 2020 22:11)    ID consulted for fevers.   The patient reports that she started having fevers as high as 102 on Wednesday. Has had more coughing than usual. Had dental work about 3 weeks ago, was told she had an impacted tooth with infection and was treated with amoxicillin. No antibiotics before the dental work. SOB worsening over last few days to the point she is SOB at rest.       PAST MEDICAL & SURGICAL HISTORY:  Morbid obesity  Aortic stenosis: moderate  Atrial fibrillation  Spinal stenosis  HLD (hyperlipidemia)  HTN (hypertension)  Asthma  History of cataract surgery, left  Lipoma  Hx of hysterectomy      REVIEW OF SYSTEMS:  Constitutional: no weakness, +fevers, +chills  Dermatologic: no rash  Respiratory: +SOB, +cough  Cardiovascular: no chest pain, +palpitations  Gastrointestinal: no nausea, no vomiting, no diarrhea  Genitourinary: no dysuria, no urinary frequency,  Musculoskeletal:	no weakness, no joint swelling/pain  Neurological: no focal weakness or numbness  Endocrine: no polyuria, no polydipsia  Psych: No anxiety   Extremities: some swelling       ACTIVE ANTIMICROBIAL/ANTIBIOTIC MEDICATIONS:      OTHER MEDICATIONS:  albuterol/ipratropium for Nebulization 3 milliLiter(s) Nebulizer every 6 hours  aspirin enteric coated 81 milliGRAM(s) Oral daily  diltiazem    milliGRAM(s) Oral daily  ferrous    sulfate 325 milliGRAM(s) Oral daily  folic acid 1 milliGRAM(s) Oral daily  furosemide   Injectable 40 milliGRAM(s) IV Push two times a day  isosorbide   mononitrate ER Tablet (IMDUR) 30 milliGRAM(s) Oral daily  letrozole 2.5 milliGRAM(s) Oral daily  metoprolol tartrate 50 milliGRAM(s) Oral two times a day  multivitamin/minerals 1 Tablet(s) Oral daily  sertraline 100 milliGRAM(s) Oral daily  warfarin 2 milliGRAM(s) Oral once      ALLERGIES:  Allergies  caffeine (Unknown)  menthol topical (Unknown)  Originally Entered as [Unknown] reaction to [MENTHOL] (Unknown)  sulfa (Unknown)  sulfa drugs (Unknown)        SOCIAL HISTORY: Lives alone, daughter lives 4 blocks away. Never smoker, no alcohol, no drugs.       FAMILY HISTORY:  Father: MI       VITAL SIGNS:  ICU Vital Signs Last 24 Hrs  T(C): 36.9 (03 Jul 2020 04:10), Max: 37.8 (02 Jul 2020 15:35)  T(F): 98.5 (03 Jul 2020 04:10), Max: 100.1 (02 Jul 2020 15:35)  HR: 79 (03 Jul 2020 04:10) (67 - 100)  BP: 112/70 (03 Jul 2020 04:10) (94/66 - 114/59)  BP(mean): --  ABP: --  ABP(mean): --  RR: 18 (03 Jul 2020 07:15) (17 - 20)  SpO2: 99% (03 Jul 2020 07:15) (93% - 99%)      PHYSICAL EXAM:  Constitutional: Elderly woman sitting comfortably on bedside   Head: NC/AT  Eyes: anicteric sclera  ENMT: no rhinorrhea; no oropharyngeal lesions, erythema, or exudates, poor dentition 	  Neck: supple;   Respiratory: Decreased breath sounds  Cardiovascular: Distant heart sounds  Gastrointestinal: soft, NT/ND; +BS  Extremities: WWP; trace edema  Vascular: peripheral IV, no phlebitis   Dermatologic: hyperpigmentation of the LEs bilaterally   Neurologic: AAOx3; no focal deficits      LABS:                        12.3   5.67  )-----------( 115      ( 03 Jul 2020 06:45 )             38.9     07-03    138  |  98  |  34<H>  ----------------------------<  101<H>  3.8   |  28  |  1.13    Ca    9.2      03 Jul 2020 06:45    TPro  7.1  /  Alb  4.1  /  TBili  0.6  /  DBili  x   /  AST  16  /  ALT  9<L>  /  AlkPhos  58  07-02    PT/INR - ( 03 Jul 2020 08:47 )   PT: 31.6 sec;   INR: 2.81 ratio         PTT - ( 02 Jul 2020 15:15 )  PTT:40.1 sec      MICROBIOLOGY:  Blood cultures pending in lab     COVID-19 PCR . (07.02.20 @ 15:15)    COVID-19 PCR: NotDetec:      RADIOLOGY & ADDITIONAL STUDIES: Imaging reviewed personally and interpretation as mentioned below.     < from: CT Angio Chest w/ IV Cont (07.02.20 @ 18:55) >  IMPRESSION:   No pulmonary embolus in the main pulmonary artery, the right pulmonary artery, the left pulmonary artery or the segmental pulmonary arteries. Evaluation of some of the subsegmental pulmonary arteries is limited secondary to poor opacification and motion artifact.    Redemonstrated aremultiple thyroid lesions, largest measuring 2.4 x 1.2 cm, previously measuring 1.9 x 1.2 cm when remeasured on 1/8/2018. Heterogeneous thyroid with multiple thyroid nodules, the largest measuring 2 x 1.2 cm, slightly enlarged from prior.    Indeterminate left upper quadrant 4 cm soft tissue density lesion between the left adrenal gland and stomach is enlarged and the prior study when it measured 3.5 cm.

## 2020-10-28 ENCOUNTER — OFFICE VISIT (OUTPATIENT)
Dept: FAMILY MEDICINE CLINIC | Facility: CLINIC | Age: 35
End: 2020-10-28

## 2020-10-28 VITALS
HEIGHT: 66 IN | HEART RATE: 98 BPM | TEMPERATURE: 96.6 F | OXYGEN SATURATION: 98 % | WEIGHT: 189.6 LBS | DIASTOLIC BLOOD PRESSURE: 84 MMHG | SYSTOLIC BLOOD PRESSURE: 118 MMHG | BODY MASS INDEX: 30.47 KG/M2

## 2020-10-28 DIAGNOSIS — F43.22 ADJUSTMENT DISORDER WITH ANXIETY: Primary | ICD-10-CM

## 2020-10-28 PROCEDURE — 99214 OFFICE O/P EST MOD 30 MIN: CPT | Performed by: FAMILY MEDICINE

## 2020-10-28 RX ORDER — ESCITALOPRAM OXALATE 10 MG/1
10 TABLET ORAL DAILY
Qty: 30 TABLET | Refills: 1 | Status: SHIPPED | OUTPATIENT
Start: 2020-10-28 | End: 2020-11-30 | Stop reason: SDUPTHER

## 2020-10-28 NOTE — PROGRESS NOTES
Subjective   Bren Lynch is a 35 y.o. female.     Chief Complaint   Patient presents with   • Anxiety     Snapping at the kids, short patience        Patient presents somewhat tearful today.  She is worried all the time and very stressed.  They does had put her son at 10 on medication for his anxiety.  Her  has a mood disorder that he refused to treat.  They had an argument again this morning.  She has never felt like this before.    She does have a family history of anxiety.    She has not had any panic attacks but cannot relax and is worried all the time.  She denies any suicidal or homicidal ideation.  She has been impatient with the kids and snaps at them.  She is just tired of everything.         The following portions of the patient's history were reviewed and updated as appropriate: allergies, current medications, past family history, past medical history, past social history, past surgical history and problem list.    History reviewed. No pertinent past medical history.    Past Surgical History:   Procedure Laterality Date   •  SECTION     •  SECTION     • TONSILLECTOMY         Family History   Problem Relation Age of Onset   • Breast cancer Mother 58   • Hypertension Mother    • Hyperlipidemia Mother    • Other Mother         BLOOD CLOTS   • Liver cancer Mother 65   • Bone cancer Mother 65   • Stroke Maternal Grandfather    • Lung cancer Maternal Grandfather    • Emphysema Maternal Grandfather    • Uterine cancer Paternal Grandmother 60   • Stroke Paternal Grandfather    • Prostate cancer Paternal Grandfather    • Hypertension Father    • Hyperlipidemia Father    • Osteoporosis Maternal Grandmother    • Cleft palate Cousin    • Mental retardation Cousin    • Von Willebrand disease Cousin    • Lung cancer Maternal Uncle 56   • Brain cancer Maternal Uncle 56   • Bone cancer Maternal Uncle 56   • Ovarian cancer Neg Hx    • Colon cancer Neg Hx    • Melanoma Neg Hx   "      Social History     Socioeconomic History   • Marital status:      Spouse name: MARIA D   • Number of children: 2   • Years of education: 12   • Highest education level: Not on file   Occupational History   • Occupation: HOMEMAKER   Tobacco Use   • Smoking status: Never Smoker   • Smokeless tobacco: Never Used   Substance and Sexual Activity   • Alcohol use: No   • Drug use: No   • Sexual activity: Yes     Partners: Male     Birth control/protection: I.U.D.     Comment: Mirena 2/2017         Current Outpatient Medications:   •  fexofenadine (ALLEGRA) 180 MG tablet, Take 180 mg by mouth As Needed., Disp: , Rfl:   •  levonorgestrel (MIRENA, 52 MG,) 20 MCG/24HR IUD, 1 each by Intrauterine route 1 (One) Time., Disp: , Rfl:   •  escitalopram (Lexapro) 10 MG tablet, Take 1 tablet by mouth Daily., Disp: 30 tablet, Rfl: 1    Review of Systems   Constitutional: Negative.    Neurological: Negative.    Psychiatric/Behavioral: Positive for dysphoric mood. Negative for agitation, behavioral problems, confusion, decreased concentration, hallucinations, self-injury, sleep disturbance and suicidal ideas. The patient is nervous/anxious. The patient is not hyperactive.        Objective   Vitals:    10/28/20 1406   BP: 118/84   Pulse: 98   Temp: 96.6 °F (35.9 °C)   SpO2: 98%   Weight: 86 kg (189 lb 9.6 oz)   Height: 167.6 cm (66\")     Body mass index is 30.6 kg/m².  Physical Exam  Constitutional:       General: She is not in acute distress.     Appearance: She is well-developed.   HENT:      Head: Normocephalic and atraumatic.   Eyes:      Conjunctiva/sclera: Conjunctivae normal.      Pupils: Pupils are equal, round, and reactive to light.   Pulmonary:      Effort: Pulmonary effort is normal. No respiratory distress.   Neurological:      Mental Status: She is alert and oriented to person, place, and time.   Psychiatric:      Comments: Minimally tearful.           Assessment/Plan   Diagnoses and all orders for this " visit:    1. Adjustment disorder with anxiety (Primary)  -     escitalopram (Lexapro) 10 MG tablet; Take 1 tablet by mouth Daily.  Dispense: 30 tablet; Refill: 1               Patient Instructions   Managing Anxiety, Adult  After being diagnosed with an anxiety disorder, you may be relieved to know why you have felt or behaved a certain way. You may also feel overwhelmed about the treatment ahead and what it will mean for your life. With care and support, you can manage this condition and recover from it.  How to manage lifestyle changes  Managing stress and anxiety    Stress is your body's reaction to life changes and events, both good and bad. Most stress will last just a few hours, but stress can be ongoing and can lead to more than just stress. Although stress can play a major role in anxiety, it is not the same as anxiety. Stress is usually caused by something external, such as a deadline, test, or competition. Stress normally passes after the triggering event has ended.   Anxiety is caused by something internal, such as imagining a terrible outcome or worrying that something will go wrong that will devastate you. Anxiety often does not go away even after the triggering event is over, and it can become long-term (chronic) worry. It is important to understand the differences between stress and anxiety and to manage your stress effectively so that it does not lead to an anxious response.  Talk with your health care provider or a counselor to learn more about reducing anxiety and stress. He or she may suggest tension reduction techniques, such as:  · Music therapy. This can include creating or listening to music that you enjoy and that inspires you.  · Mindfulness-based meditation. This involves being aware of your normal breaths while not trying to control your breathing. It can be done while sitting or walking.  · Centering prayer. This involves focusing on a word, phrase, or sacred image that means something to  you and brings you peace.  · Deep breathing. To do this, expand your stomach and inhale slowly through your nose. Hold your breath for 3-5 seconds. Then exhale slowly, letting your stomach muscles relax.  · Self-talk. This involves identifying thought patterns that lead to anxiety reactions and changing those patterns.  · Muscle relaxation. This involves tensing muscles and then relaxing them.  Choose a tension reduction technique that suits your lifestyle and personality. These techniques take time and practice. Set aside 5-15 minutes a day to do them. Therapists can offer counseling and training in these techniques. The training to help with anxiety may be covered by some insurance plans. Other things you can do to manage stress and anxiety include:  · Keeping a stress/anxiety diary. This can help you learn what triggers your reaction and then learn ways to manage your response.  · Thinking about how you react to certain situations. You may not be able to control everything, but you can control your response.  · Making time for activities that help you relax and not feeling guilty about spending your time in this way.  · Visual imagery and yoga can help you stay calm and relax.    Medicines  Medicines can help ease symptoms. Medicines for anxiety include:  · Anti-anxiety drugs.  · Antidepressants.  Medicines are often used as a primary treatment for anxiety disorder. Medicines will be prescribed by a health care provider. When used together, medicines, psychotherapy, and tension reduction techniques may be the most effective treatment.  Relationships  Relationships can play a big part in helping you recover. Try to spend more time connecting with trusted friends and family members. Consider going to couples counseling, taking family education classes, or going to family therapy. Therapy can help you and others better understand your condition.  How to recognize changes in your anxiety  Everyone responds  differently to treatment for anxiety. Recovery from anxiety happens when symptoms decrease and stop interfering with your daily activities at home or work. This may mean that you will start to:  · Have better concentration and focus. Worry will interfere less in your daily thinking.  · Sleep better.  · Be less irritable.  · Have more energy.  · Have improved memory.  It is important to recognize when your condition is getting worse. Contact your health care provider if your symptoms interfere with home or work and you feel like your condition is not improving.  Follow these instructions at home:  Activity  · Exercise. Most adults should do the following:  ? Exercise for at least 150 minutes each week. The exercise should increase your heart rate and make you sweat (moderate-intensity exercise).  ? Strengthening exercises at least twice a week.  · Get the right amount and quality of sleep. Most adults need 7-9 hours of sleep each night.  Lifestyle    · Eat a healthy diet that includes plenty of vegetables, fruits, whole grains, low-fat dairy products, and lean protein. Do not eat a lot of foods that are high in solid fats, added sugars, or salt.  · Make choices that simplify your life.  · Do not use any products that contain nicotine or tobacco, such as cigarettes, e-cigarettes, and chewing tobacco. If you need help quitting, ask your health care provider.  · Avoid caffeine, alcohol, and certain over-the-counter cold medicines. These may make you feel worse. Ask your pharmacist which medicines to avoid.  General instructions  · Take over-the-counter and prescription medicines only as told by your health care provider.  · Keep all follow-up visits as told by your health care provider. This is important.  Where to find support  You can get help and support from these sources:  · Self-help groups.  · Online and community organizations.  · A trusted spiritual leader.  · Couples counseling.  · Family education  classes.  · Family therapy.  Where to find more information  You may find that joining a support group helps you deal with your anxiety. The following sources can help you locate counselors or support groups near you:  · Mental Health [place]: www.mentalhealth[place].net  · Anxiety and Depression Association of [place] (ADAA): www.adaa.org  · National [employer] Mental Illness (CY): www.cy.org  Contact a health care provider if you:  · Have a hard time staying focused or finishing daily tasks.  · Spend many hours a day feeling worried about everyday life.  · Become exhausted by worry.  · Start to have headaches, feel tense, or have nausea.  · Urinate more than normal.  · Have diarrhea.  Get help right away if you have:  · A racing heart and shortness of breath.  · Thoughts of hurting yourself or others.  If you ever feel like you may hurt yourself or others, or have thoughts about taking your own life, get help right away. You can go to your nearest emergency department or call:  · Your local emergency services (911 in the [place]  · A suicide crisis helpline, such as the National Suicide Prevention Lifeline at 1-120.589.5899. This is open 24 hours a day.  Summary  · Taking steps to learn and use tension reduction techniques can help calm you and help prevent triggering an anxiety reaction.  · When used together, medicines, psychotherapy, and tension reduction techniques may be the most effective treatment.  · Family, friends, and partners can play a big part in helping you recover from an anxiety disorder.  This information is not intended to replace advice given to you by your health care provider. Make sure you discuss any questions you have with your health care provider.  Document Released: 12/12/2017 Document Revised: 05/19/2020 Document Reviewed: 05/19/2020  Elsevier Patient Education © 2020 [employer]

## 2020-10-28 NOTE — PATIENT INSTRUCTIONS

## 2020-11-02 ENCOUNTER — OFFICE VISIT (OUTPATIENT)
Dept: FAMILY MEDICINE CLINIC | Facility: CLINIC | Age: 35
End: 2020-11-02

## 2020-11-02 VITALS
HEART RATE: 78 BPM | WEIGHT: 189.2 LBS | OXYGEN SATURATION: 98 % | TEMPERATURE: 97.3 F | BODY MASS INDEX: 30.41 KG/M2 | SYSTOLIC BLOOD PRESSURE: 108 MMHG | DIASTOLIC BLOOD PRESSURE: 80 MMHG | HEIGHT: 66 IN

## 2020-11-02 DIAGNOSIS — B02.9 HERPES ZOSTER WITHOUT COMPLICATION: Primary | ICD-10-CM

## 2020-11-02 PROCEDURE — 99214 OFFICE O/P EST MOD 30 MIN: CPT | Performed by: FAMILY MEDICINE

## 2020-11-02 RX ORDER — VALACYCLOVIR HYDROCHLORIDE 1 G/1
1000 TABLET, FILM COATED ORAL 3 TIMES DAILY
Qty: 21 TABLET | Refills: 0 | Status: SHIPPED | OUTPATIENT
Start: 2020-11-02 | End: 2020-11-30

## 2020-11-02 NOTE — PATIENT INSTRUCTIONS
Shingles    Shingles, which is also known as herpes zoster, is an infection that causes a painful skin rash and fluid-filled blisters. It is caused by a virus.  Shingles only develops in people who:  · Have had chickenpox.  · Have been given a medicine to protect against chickenpox (have been vaccinated). Shingles is rare in this group.  What are the causes?  Shingles is caused by varicella-zoster virus (VZV). This is the same virus that causes chickenpox. After a person is exposed to VZV, the virus stays in the body in an inactive (dormant) state. Shingles develops if the virus is reactivated. This can happen many years after the first (initial) exposure to VZV. It is not known what causes this virus to be reactivated.  What increases the risk?  People who have had chickenpox or received the chickenpox vaccine are at risk for shingles. Shingles infection is more common in people who:  · Are older than age 60.  · Have a weakened disease-fighting system (immune system), such as people with:  ? HIV.  ? AIDS.  ? Cancer.  · Are taking medicines that weaken the immune system, such as transplant medicines.  · Are experiencing a lot of stress.  What are the signs or symptoms?  Early symptoms of this condition include itching, tingling, and pain in an area on your skin. Pain may be described as burning, stabbing, or throbbing.  A few days or weeks after early symptoms start, a painful red rash appears. The rash is usually on one side of the body and has a band-like or belt-like pattern. The rash eventually turns into fluid-filled blisters that break open, change into scabs, and dry up in about 2-3 weeks.  At any time during the infection, you may also develop:  · A fever.  · Chills.  · A headache.  · An upset stomach.  How is this diagnosed?  This condition is diagnosed with a skin exam. Skin or fluid samples may be taken from the blisters before a diagnosis is made. These samples are examined under a microscope or sent to  a lab for testing.  How is this treated?  The rash may last for several weeks. There is not a specific cure for this condition. Your health care provider will probably prescribe medicines to help you manage pain, recover more quickly, and avoid long-term problems. Medicines may include:  · Antiviral drugs.  · Anti-inflammatory drugs.  · Pain medicines.  · Anti-itching medicines (antihistamines).  If the area involved is on your face, you may be referred to a specialist, such as an eye doctor (ophthalmologist) or an ear, nose, and throat (ENT) doctor (otolaryngologist) to help you avoid eye problems, chronic pain, or disability.  Follow these instructions at home:  Medicines  · Take over-the-counter and prescription medicines only as told by your health care provider.  · Apply an anti-itch cream or numbing cream to the affected area as told by your health care provider.  Relieving itching and discomfort    · Apply cold, wet cloths (cold compresses) to the area of the rash or blisters as told by your health care provider.  · Cool baths can be soothing. Try adding baking soda or dry oatmeal to the water to reduce itching. Do not bathe in hot water.  Blister and rash care  · Keep your rash covered with a loose bandage (dressing). Wear loose-fitting clothing to help ease the pain of material rubbing against the rash.  · Keep your rash and blisters clean by washing the area with mild soap and cool water as told by your health care provider.  · Check your rash every day for signs of infection. Check for:  ? More redness, swelling, or pain.  ? Fluid or blood.  ? Warmth.  ? Pus or a bad smell.  · Do not scratch your rash or pick at your blisters. To help avoid scratching:  ? Keep your fingernails clean and cut short.  ? Wear gloves or mittens while you sleep, if scratching is a problem.  General instructions  · Rest as told by your health care provider.  · Keep all follow-up visits as told by your health care provider. This  is important.  · Wash your hands often with soap and water. If soap and water are not available, use hand . Doing this lowers your chance of getting a bacterial skin infection.  · Before your blisters change into scabs, your shingles infection can cause chickenpox in people who have never had it or have never been vaccinated against it. To prevent this from happening, avoid contact with other people, especially:  ? Babies.  ? Pregnant women.  ? Children who have eczema.  ? Elderly people who have transplants.  ? People who have chronic illnesses, such as cancer or AIDS.  Contact a health care provider if:  · Your pain is not relieved with prescribed medicines.  · Your pain does not get better after the rash heals.  · You have signs of infection in the rash area, such as:  ? More redness, swelling, or pain around the rash.  ? Fluid or blood coming from the rash.  ? The rash area feeling warm to the touch.  ? Pus or a bad smell coming from the rash.  Get help right away if:  · The rash is on your face or nose.  · You have facial pain, pain around your eye area, or loss of feeling on one side of your face.  · You have difficulty seeing.  · You have ear pain or have ringing in your ear.  · You have a loss of taste.  · Your condition gets worse.  Summary  · Shingles, which is also known as herpes zoster, is an infection that causes a painful skin rash and fluid-filled blisters.  · This condition is diagnosed with a skin exam. Skin or fluid samples may be taken from the blisters and examined before the diagnosis is made.  · Keep your rash covered with a loose bandage (dressing). Wear loose-fitting clothing to help ease the pain of material rubbing against the rash.  · Before your blisters change into scabs, your shingles infection can cause chickenpox in people who have never had it or have never been vaccinated against it.  This information is not intended to replace advice given to you by your health care  provider. Make sure you discuss any questions you have with your health care provider.  Document Released: 12/18/2006 Document Revised: 04/10/2020 Document Reviewed: 08/22/2018  Elsevier Patient Education © 2020 [employer]

## 2020-11-02 NOTE — PROGRESS NOTES
Subjective   Bren Lynch is a 35 y.o. female.     Chief Complaint   Patient presents with   • Skin Lesion     Started new med 29th (lexapro), started with sinus, then face swollen and puffy with skin lesions       Patient is here with a new problem.  She is Dr. Kehrer's patient and Dr. Kehrer saw her last week on Wednesday and was started on Lexapro for anxiety.  However, she thinks that the day prior she started noticing a couple of pimples above her left eye.  They began to itch and then she noticed eye swelling and increasing redness of that area above her left eye yesterday.  She denies any visual problems.  She denies any eye pain.  Patient denies any trauma or injury in the area.  The patient has been under a great deal of stress lately.  The patient denies ever having shingles but has had chickenpox in the past.       Review of Systems   Constitutional: Negative for activity change, chills, fatigue and fever.   HENT: Negative for hearing loss, swollen glands, tinnitus and trouble swallowing.    Eyes: Negative for pain and visual disturbance.   Respiratory: Negative for cough and shortness of breath.    Cardiovascular: Negative for chest pain, palpitations and leg swelling.   Gastrointestinal: Negative for diarrhea and nausea.   Endocrine: Negative for polydipsia and polyuria.   Genitourinary: Negative for difficulty urinating and urinary incontinence.   Musculoskeletal: Negative for arthralgias, gait problem and joint swelling.   Skin: Positive for rash.   Allergic/Immunologic: Negative for immunocompromised state.   Neurological: Negative for dizziness, light-headedness and headache.   Hematological: Negative for adenopathy. Does not bruise/bleed easily.   Psychiatric/Behavioral: Negative for dysphoric mood and sleep disturbance.       The following portions of the patient's history were reviewed and updated as appropriate: allergies, current medications, past family history, past medical history, past  social history, past surgical history and problem list.    History reviewed. No pertinent past medical history.    Past Surgical History:   Procedure Laterality Date   •  SECTION     •  SECTION     • TONSILLECTOMY         Family History   Problem Relation Age of Onset   • Breast cancer Mother 58   • Hypertension Mother    • Hyperlipidemia Mother    • Other Mother         BLOOD CLOTS   • Liver cancer Mother 65   • Bone cancer Mother 65   • Stroke Maternal Grandfather    • Lung cancer Maternal Grandfather    • Emphysema Maternal Grandfather    • Uterine cancer Paternal Grandmother 60   • Stroke Paternal Grandfather    • Prostate cancer Paternal Grandfather    • Hypertension Father    • Hyperlipidemia Father    • Osteoporosis Maternal Grandmother    • Cleft palate Cousin    • Mental retardation Cousin    • Von Willebrand disease Cousin    • Lung cancer Maternal Uncle 56   • Brain cancer Maternal Uncle 56   • Bone cancer Maternal Uncle 56   • Ovarian cancer Neg Hx    • Colon cancer Neg Hx    • Melanoma Neg Hx        Social History     Socioeconomic History   • Marital status:      Spouse name: MARIA D   • Number of children: 2   • Years of education: 12   • Highest education level: Not on file   Occupational History   • Occupation: HOMEMAKER   Tobacco Use   • Smoking status: Never Smoker   • Smokeless tobacco: Never Used   Substance and Sexual Activity   • Alcohol use: No   • Drug use: No   • Sexual activity: Yes     Partners: Male     Birth control/protection: I.U.D.     Comment: Mirena 2017         Current Outpatient Medications:   •  fexofenadine (ALLEGRA) 180 MG tablet, Take 180 mg by mouth As Needed., Disp: , Rfl:   •  levonorgestrel (MIRENA, 52 MG,) 20 MCG/24HR IUD, 1 each by Intrauterine route 1 (One) Time., Disp: , Rfl:   •  escitalopram (Lexapro) 10 MG tablet, Take 1 tablet by mouth Daily., Disp: 30 tablet, Rfl: 1  •  valACYclovir (Valtrex) 1000 MG tablet, Take 1 tablet by  "mouth 3 (Three) Times a Day., Disp: 21 tablet, Rfl: 0    Objective     Vitals:    11/02/20 0916   BP: 108/80   Pulse: 78   Temp: 97.3 °F (36.3 °C)   SpO2: 98%   Weight: 85.8 kg (189 lb 3.2 oz)   Height: 167.6 cm (66\")       Body mass index is 30.54 kg/m².    No components found for: 2D    Physical Exam  Vitals signs and nursing note reviewed.   Constitutional:       Appearance: She is well-developed.   HENT:      Head: Normocephalic and atraumatic.      Right Ear: External ear normal.      Left Ear: External ear normal.      Nose: Nose normal.   Eyes:      General: No scleral icterus.        Left eye: No discharge.      Extraocular Movements: Extraocular movements intact.      Conjunctiva/sclera: Conjunctivae normal.      Pupils: Pupils are equal, round, and reactive to light.   Neck:      Musculoskeletal: Normal range of motion and neck supple.   Cardiovascular:      Rate and Rhythm: Normal rate and regular rhythm.      Heart sounds: Normal heart sounds.   Pulmonary:      Effort: Pulmonary effort is normal.      Breath sounds: Normal breath sounds.   Musculoskeletal:      Right lower leg: No edema.      Left lower leg: No edema.   Lymphadenopathy:      Cervical: No cervical adenopathy.   Skin:     General: Skin is warm and dry.      Comments: Vesicular patch measuring about 1 cm just to the left of the bridge of the patient's nose within the left eyebrow region.  Slight localized edema and erythema noted also some mild edema of the left upper eyelid.     Neurological:      Mental Status: She is alert and oriented to person, place, and time.   Psychiatric:         Mood and Affect: Mood normal.         Behavior: Behavior normal.         Thought Content: Thought content normal.         Judgment: Judgment normal.         Procedures    Assessment/Plan   Diagnoses and all orders for this visit:    1. Herpes zoster without complication (Primary)  -     valACYclovir (Valtrex) 1000 MG tablet; Take 1 tablet by mouth 3 " (Three) Times a Day.  Dispense: 21 tablet; Refill: 0    The patient was advised to start the medication immediately and finish all of it.  She was also advised to contact her eye doctor, [facility], today and request an appointment within the next 48 hours.  She should be seen immediately if she starts having any visual changes.  Contact me if her symptoms are worsening or if they fail to improve in the next week.  I advised the patient to wash the area with mild soap and water daily to keep from secondary infection.    Patient Instructions   Shingles    Shingles, which is also known as herpes zoster, is an infection that causes a painful skin rash and fluid-filled blisters. It is caused by a virus.  Shingles only develops in people who:  · Have had chickenpox.  · Have been given a medicine to protect against chickenpox (have been vaccinated). Shingles is rare in this group.  What are the causes?  Shingles is caused by varicella-zoster virus (VZV). This is the same virus that causes chickenpox. After a person is exposed to VZV, the virus stays in the body in an inactive (dormant) state. Shingles develops if the virus is reactivated. This can happen many years after the first (initial) exposure to VZV. It is not known what causes this virus to be reactivated.  What increases the risk?  People who have had chickenpox or received the chickenpox vaccine are at risk for shingles. Shingles infection is more common in people who:  · Are older than age 60.  · Have a weakened disease-fighting system (immune system), such as people with:  ? HIV.  ? AIDS.  ? Cancer.  · Are taking medicines that weaken the immune system, such as transplant medicines.  · Are experiencing a lot of stress.  What are the signs or symptoms?  Early symptoms of this condition include itching, tingling, and pain in an area on your skin. Pain may be described as burning, stabbing, or throbbing.  A few days or weeks after early symptoms start, a  painful red rash appears. The rash is usually on one side of the body and has a band-like or belt-like pattern. The rash eventually turns into fluid-filled blisters that break open, change into scabs, and dry up in about 2-3 weeks.  At any time during the infection, you may also develop:  · A fever.  · Chills.  · A headache.  · An upset stomach.  How is this diagnosed?  This condition is diagnosed with a skin exam. Skin or fluid samples may be taken from the blisters before a diagnosis is made. These samples are examined under a microscope or sent to a lab for testing.  How is this treated?  The rash may last for several weeks. There is not a specific cure for this condition. Your health care provider will probably prescribe medicines to help you manage pain, recover more quickly, and avoid long-term problems. Medicines may include:  · Antiviral drugs.  · Anti-inflammatory drugs.  · Pain medicines.  · Anti-itching medicines (antihistamines).  If the area involved is on your face, you may be referred to a specialist, such as an eye doctor (ophthalmologist) or an ear, nose, and throat (ENT) doctor (otolaryngologist) to help you avoid eye problems, chronic pain, or disability.  Follow these instructions at home:  Medicines  · Take over-the-counter and prescription medicines only as told by your health care provider.  · Apply an anti-itch cream or numbing cream to the affected area as told by your health care provider.  Relieving itching and discomfort    · Apply cold, wet cloths (cold compresses) to the area of the rash or blisters as told by your health care provider.  · Cool baths can be soothing. Try adding baking soda or dry oatmeal to the water to reduce itching. Do not bathe in hot water.  Blister and rash care  · Keep your rash covered with a loose bandage (dressing). Wear loose-fitting clothing to help ease the pain of material rubbing against the rash.  · Keep your rash and blisters clean by washing the area  with mild soap and cool water as told by your health care provider.  · Check your rash every day for signs of infection. Check for:  ? More redness, swelling, or pain.  ? Fluid or blood.  ? Warmth.  ? Pus or a bad smell.  · Do not scratch your rash or pick at your blisters. To help avoid scratching:  ? Keep your fingernails clean and cut short.  ? Wear gloves or mittens while you sleep, if scratching is a problem.  General instructions  · Rest as told by your health care provider.  · Keep all follow-up visits as told by your health care provider. This is important.  · Wash your hands often with soap and water. If soap and water are not available, use hand . Doing this lowers your chance of getting a bacterial skin infection.  · Before your blisters change into scabs, your shingles infection can cause chickenpox in people who have never had it or have never been vaccinated against it. To prevent this from happening, avoid contact with other people, especially:  ? Babies.  ? Pregnant women.  ? Children who have eczema.  ? Elderly people who have transplants.  ? People who have chronic illnesses, such as cancer or AIDS.  Contact a health care provider if:  · Your pain is not relieved with prescribed medicines.  · Your pain does not get better after the rash heals.  · You have signs of infection in the rash area, such as:  ? More redness, swelling, or pain around the rash.  ? Fluid or blood coming from the rash.  ? The rash area feeling warm to the touch.  ? Pus or a bad smell coming from the rash.  Get help right away if:  · The rash is on your face or nose.  · You have facial pain, pain around your eye area, or loss of feeling on one side of your face.  · You have difficulty seeing.  · You have ear pain or have ringing in your ear.  · You have a loss of taste.  · Your condition gets worse.  Summary  · Shingles, which is also known as herpes zoster, is an infection that causes a painful skin rash and  fluid-filled blisters.  · This condition is diagnosed with a skin exam. Skin or fluid samples may be taken from the blisters and examined before the diagnosis is made.  · Keep your rash covered with a loose bandage (dressing). Wear loose-fitting clothing to help ease the pain of material rubbing against the rash.  · Before your blisters change into scabs, your shingles infection can cause chickenpox in people who have never had it or have never been vaccinated against it.  This information is not intended to replace advice given to you by your health care provider. Make sure you discuss any questions you have with your health care provider.  Document Released: 12/18/2006 Document Revised: 04/10/2020 Document Reviewed: 08/22/2018  Elsevier Patient Education © 2020 [employer]

## 2020-11-30 ENCOUNTER — TELEMEDICINE (OUTPATIENT)
Dept: FAMILY MEDICINE CLINIC | Facility: CLINIC | Age: 35
End: 2020-11-30

## 2020-11-30 DIAGNOSIS — F43.22 ADJUSTMENT DISORDER WITH ANXIETY: Primary | ICD-10-CM

## 2020-11-30 PROCEDURE — 99213 OFFICE O/P EST LOW 20 MIN: CPT | Performed by: FAMILY MEDICINE

## 2020-11-30 RX ORDER — ESCITALOPRAM OXALATE 10 MG/1
10 TABLET ORAL DAILY
Qty: 90 TABLET | Refills: 0 | Status: SHIPPED | OUTPATIENT
Start: 2020-11-30 | End: 2021-04-29 | Stop reason: SDUPTHER

## 2020-11-30 NOTE — PROGRESS NOTES
This was an audio and video enabled telemedicine encounter.  Length of visit 10 minutes.     Subjective   Bren Lynch is a 35 y.o. female.     Chief Complaint   Patient presents with   • Anxiety        Patient presents for a video visit to follow-up during global pandemic.  I summarize our we discussed her mood at length and she feels the Lexapro has helped tremendously.  She has not cried unnecessarily in about 2 weeks.    She feels she does not snap at the children like she used to.  It has not helped her handle the stress of her current situation.  She denies SI or HI.  She denies any side effects.         The following portions of the patient's history were reviewed and updated as appropriate: allergies, current medications, past family history, past medical history, past social history, past surgical history and problem list.    History reviewed. No pertinent past medical history.    Past Surgical History:   Procedure Laterality Date   •  SECTION     •  SECTION     • TONSILLECTOMY         Family History   Problem Relation Age of Onset   • Breast cancer Mother 58   • Hypertension Mother    • Hyperlipidemia Mother    • Other Mother         BLOOD CLOTS   • Liver cancer Mother 65   • Bone cancer Mother 65   • Stroke Maternal Grandfather    • Lung cancer Maternal Grandfather    • Emphysema Maternal Grandfather    • Uterine cancer Paternal Grandmother 60   • Stroke Paternal Grandfather    • Prostate cancer Paternal Grandfather    • Hypertension Father    • Hyperlipidemia Father    • Osteoporosis Maternal Grandmother    • Cleft palate Cousin    • Mental retardation Cousin    • Von Willebrand disease Cousin    • Lung cancer Maternal Uncle 56   • Brain cancer Maternal Uncle 56   • Bone cancer Maternal Uncle 56   • Ovarian cancer Neg Hx    • Colon cancer Neg Hx    • Melanoma Neg Hx        Social History     Socioeconomic History   • Marital status:      Spouse name: MARIA D   • Number  of children: 2   • Years of education: 12   • Highest education level: Not on file   Occupational History   • Occupation: HOMEMAKER   Tobacco Use   • Smoking status: Never Smoker   • Smokeless tobacco: Never Used   Substance and Sexual Activity   • Alcohol use: No   • Drug use: No   • Sexual activity: Yes     Partners: Male     Birth control/protection: I.U.D.     Comment: Mirena 2/2017         Current Outpatient Medications:   •  escitalopram (Lexapro) 10 MG tablet, Take 1 tablet by mouth Daily., Disp: 90 tablet, Rfl: 0  •  fexofenadine (ALLEGRA) 180 MG tablet, Take 180 mg by mouth As Needed., Disp: , Rfl:   •  levonorgestrel (MIRENA, 52 MG,) 20 MCG/24HR IUD, 1 each by Intrauterine route 1 (One) Time., Disp: , Rfl:     Review of Systems   Constitutional: Negative.    Neurological: Negative.    Psychiatric/Behavioral: Negative for agitation, behavioral problems, confusion, decreased concentration, dysphoric mood, hallucinations, self-injury, sleep disturbance and suicidal ideas. The patient is nervous/anxious. The patient is not hyperactive.        Objective   There were no vitals filed for this visit.  There is no height or weight on file to calculate BMI.  Physical Exam  Constitutional:       General: She is not in acute distress.     Appearance: She is well-developed.   HENT:      Head: Normocephalic and atraumatic.   Eyes:      Conjunctiva/sclera: Conjunctivae normal.      Pupils: Pupils are equal, round, and reactive to light.   Pulmonary:      Effort: Pulmonary effort is normal. No respiratory distress.   Neurological:      Mental Status: She is alert and oriented to person, place, and time.           Assessment/Plan   Diagnoses and all orders for this visit:    1. Adjustment disorder with anxiety (Primary)  -     escitalopram (Lexapro) 10 MG tablet; Take 1 tablet by mouth Daily.  Dispense: 90 tablet; Refill: 0               There are no Patient Instructions on file for this visit.

## 2021-04-02 ENCOUNTER — OFFICE VISIT (OUTPATIENT)
Dept: FAMILY MEDICINE CLINIC | Facility: CLINIC | Age: 36
End: 2021-04-02

## 2021-04-02 VITALS
SYSTOLIC BLOOD PRESSURE: 104 MMHG | DIASTOLIC BLOOD PRESSURE: 72 MMHG | OXYGEN SATURATION: 98 % | HEIGHT: 66 IN | WEIGHT: 184.4 LBS | HEART RATE: 93 BPM | TEMPERATURE: 96.6 F | BODY MASS INDEX: 29.63 KG/M2

## 2021-04-02 DIAGNOSIS — R39.198 DIFFICULTY URINATING: ICD-10-CM

## 2021-04-02 DIAGNOSIS — R82.998 LEUKOCYTES IN URINE: ICD-10-CM

## 2021-04-02 DIAGNOSIS — N30.01 ACUTE CYSTITIS WITH HEMATURIA: Primary | ICD-10-CM

## 2021-04-02 LAB
BILIRUB BLD-MCNC: NEGATIVE MG/DL
CLARITY, POC: ABNORMAL
COLOR UR: YELLOW
GLUCOSE UR STRIP-MCNC: NEGATIVE MG/DL
KETONES UR QL: NEGATIVE
LEUKOCYTE EST, POC: ABNORMAL
NITRITE UR-MCNC: NEGATIVE MG/ML
PH UR: 7.5 [PH] (ref 5–8)
PROT UR STRIP-MCNC: ABNORMAL MG/DL
RBC # UR STRIP: ABNORMAL /UL
SP GR UR: 1.01 (ref 1–1.03)
UROBILINOGEN UR QL: NORMAL

## 2021-04-02 PROCEDURE — 99213 OFFICE O/P EST LOW 20 MIN: CPT | Performed by: FAMILY MEDICINE

## 2021-04-02 PROCEDURE — 81003 URINALYSIS AUTO W/O SCOPE: CPT | Performed by: FAMILY MEDICINE

## 2021-04-02 RX ORDER — NITROFURANTOIN 25; 75 MG/1; MG/1
100 CAPSULE ORAL 2 TIMES DAILY
Qty: 10 CAPSULE | Refills: 0 | Status: SHIPPED | OUTPATIENT
Start: 2021-04-02 | End: 2021-04-07

## 2021-04-02 NOTE — PROGRESS NOTES
"Chief Complaint  Difficulty Urinating (Burning) and Urinary Urgency    Subjective          Bren Lynch presents to [facility]  Started with dysuria yesterday.  Woke up last night to void and urine smelled.  Now has pain and frequency.  No F/C/N/V          Objective   Vital Signs:   /72   Pulse 93   Temp 96.6 °F (35.9 °C)   Ht 167.6 cm (66\")   Wt 83.6 kg (184 lb 6.4 oz)   SpO2 98%   BMI 29.76 kg/m²     Physical Exam  Vitals and nursing note reviewed.   Constitutional:       General: She is not in acute distress.  HENT:      Head: Normocephalic and atraumatic.      Right Ear: External ear normal.      Left Ear: External ear normal.      Mouth/Throat:      Mouth: Mucous membranes are moist.   Eyes:      Conjunctiva/sclera: Conjunctivae normal.      Pupils: Pupils are equal, round, and reactive to light.   Pulmonary:      Effort: No respiratory distress.   Abdominal:      Palpations: Abdomen is soft.      Tenderness: There is no abdominal tenderness. There is no right CVA tenderness or left CVA tenderness.   Neurological:      Mental Status: She is alert and oriented to person, place, and time.   Psychiatric:         Mood and Affect: Mood normal.         Behavior: Behavior normal.        Result Review :   The following data was reviewed by: Meredith Lea Kehrer, MD on 04/02/2021:  UA    Urinalysis 4/2/21   Ketones, UA Negative   Leukocytes, UA Large (3+) (A)   (A) Abnormal value                      Assessment and Plan    Diagnoses and all orders for this visit:    1. Acute cystitis with hematuria (Primary)  -     nitrofurantoin, macrocrystal-monohydrate, (Macrobid) 100 MG capsule; Take 1 capsule by mouth 2 (Two) Times a Day for 5 days.  Dispense: 10 capsule; Refill: 0    2. Difficulty urinating  -     POC Urinalysis Dipstick, Automated    3. Leukocytes in urine  -     Urine Culture - Urine, Urine, Clean Catch        Follow Up   No follow-ups on file.  Patient was given " instructions and counseling regarding her condition or for health maintenance advice. Please see specific information pulled into the AVS if appropriate.

## 2021-04-04 LAB
BACTERIA UR CULT: NORMAL
BACTERIA UR CULT: NORMAL

## 2021-04-08 ENCOUNTER — TELEPHONE (OUTPATIENT)
Dept: FAMILY MEDICINE CLINIC | Facility: CLINIC | Age: 36
End: 2021-04-08

## 2021-04-08 RX ORDER — CEPHALEXIN 500 MG/1
500 CAPSULE ORAL 2 TIMES DAILY
Qty: 10 CAPSULE | Refills: 0 | Status: SHIPPED | OUTPATIENT
Start: 2021-04-08 | End: 2021-04-13

## 2021-04-08 NOTE — TELEPHONE ENCOUNTER
Caller: Bren Lynch    Relationship: Self    Best call back number:     What medication are you requesting:     What are your current symptoms: SMELLY URINE AND IT BURNS WHEN SHE URINATES    How long have you been experiencing symptoms: 2 DAYS    Have you had these symptoms before:    [x] Yes  [] No    Have you been treated for these symptoms before:   [x] Yes  [] No    If a prescription is needed, what is your preferred pharmacy and phone number:    [facility]  [address] [place] [address] [place]  [phone]      Additional notes:PT IS CALLING IN STATING THAT SHE WAS IN LAST WEEK TO SEE DR KEHRER FOR A UTI.  PATIENT WAS PRESCRIBED A MEDICATION AND FINISHED IT ALL BUT STATES THAT HER URINE IS SMELLY AND IT BURNS WHEN SHE URINATES AND WANTS TO KNOW IF DR KEHRER CAN CALL SOMETHING ELSE IN FOR HER

## 2021-04-08 NOTE — TELEPHONE ENCOUNTER
I sent in 5 days of Keflex and she took Macrobid last week.  Her urine culture was mixed.  If her symptoms are not completely gone next week she needs to come back in to see me.

## 2021-04-29 ENCOUNTER — OFFICE VISIT (OUTPATIENT)
Dept: FAMILY MEDICINE CLINIC | Facility: CLINIC | Age: 36
End: 2021-04-29

## 2021-04-29 VITALS
SYSTOLIC BLOOD PRESSURE: 128 MMHG | TEMPERATURE: 98.2 F | HEART RATE: 84 BPM | WEIGHT: 185 LBS | HEIGHT: 66 IN | DIASTOLIC BLOOD PRESSURE: 76 MMHG | BODY MASS INDEX: 29.73 KG/M2 | OXYGEN SATURATION: 98 %

## 2021-04-29 DIAGNOSIS — Z00.00 ROUTINE HEALTH MAINTENANCE: Primary | ICD-10-CM

## 2021-04-29 DIAGNOSIS — F43.22 ADJUSTMENT DISORDER WITH ANXIETY: Chronic | ICD-10-CM

## 2021-04-29 DIAGNOSIS — R03.0 ELEVATED BLOOD PRESSURE READING: ICD-10-CM

## 2021-04-29 DIAGNOSIS — Z11.59 NEED FOR HEPATITIS C SCREENING TEST: ICD-10-CM

## 2021-04-29 DIAGNOSIS — E78.5 HYPERLIPIDEMIA, UNSPECIFIED HYPERLIPIDEMIA TYPE: ICD-10-CM

## 2021-04-29 DIAGNOSIS — J30.1 SEASONAL ALLERGIC RHINITIS DUE TO POLLEN: ICD-10-CM

## 2021-04-29 PROCEDURE — 99395 PREV VISIT EST AGE 18-39: CPT | Performed by: FAMILY MEDICINE

## 2021-04-29 PROCEDURE — 99212 OFFICE O/P EST SF 10 MIN: CPT | Performed by: FAMILY MEDICINE

## 2021-04-29 RX ORDER — ESCITALOPRAM OXALATE 10 MG/1
10 TABLET ORAL DAILY
Qty: 90 TABLET | Refills: 1 | Status: SHIPPED | OUTPATIENT
Start: 2021-04-29 | End: 2021-11-03

## 2021-04-29 NOTE — PROGRESS NOTES
Subjective   Bren Lynch is a 36 y.o. female who presents for annual female wellness exam.  Chief Complaint   Patient presents with   • Annual Exam     no pap    Patient doing very well with her anxiety on the Escitalopram.  She thinks this dose is fine and she would like to stay on it.  She has no acute concerns today.  She has not had any hopelessness or panic attacks.  She denies any SI or HI.  She has had some issues with her allergies and continues her antihistamines.     Menstrual History: mirena  Pregnancy History:   Sexual History:    Contraception: Mirena  Hormone Replacement Therapy: na  Diet: standard  Exercise: some  Do you feel safe? yes  Have you ever been abused? no    Mammogram: 2019  Pap Smear: up to date  Bone Density: na  Colon Cancer Screening: na    Immunization History   Administered Date(s) Administered   • Flu Vaccine Intradermal Quad 18-64YR 10/16/2019   • Tdap 2020       The following portions of the patient's history were reviewed and updated as appropriate: allergies, current medications, past family history, past medical history, past social history, past surgical history and problem list.    History reviewed. No pertinent past medical history.    Past Surgical History:   Procedure Laterality Date   •  SECTION     •  SECTION     • TONSILLECTOMY  1986       Family History   Problem Relation Age of Onset   • Breast cancer Mother 58   • Hypertension Mother    • Hyperlipidemia Mother    • Other Mother         BLOOD CLOTS   • Liver cancer Mother 65   • Bone cancer Mother 65   • Stroke Maternal Grandfather    • Lung cancer Maternal Grandfather    • Emphysema Maternal Grandfather    • Uterine cancer Paternal Grandmother 60   • Stroke Paternal Grandfather    • Prostate cancer Paternal Grandfather    • Hypertension Father    • Hyperlipidemia Father    • Osteoporosis Maternal Grandmother    • Cleft palate Cousin    • Mental retardation Cousin    • Von  Willebrand disease Cousin    • Lung cancer Maternal Uncle 56   • Brain cancer Maternal Uncle 56   • Bone cancer Maternal Uncle 56   • Ovarian cancer Neg Hx    • Colon cancer Neg Hx    • Melanoma Neg Hx        Social History     Socioeconomic History   • Marital status:      Spouse name: AMRIA D   • Number of children: 2   • Years of education: 12   • Highest education level: Not on file   Tobacco Use   • Smoking status: Never Smoker   • Smokeless tobacco: Never Used   Substance and Sexual Activity   • Alcohol use: No   • Drug use: No   • Sexual activity: Yes     Partners: Male     Birth control/protection: I.U.D.     Comment: Mirena 2/2017         Current Outpatient Medications:   •  escitalopram (Lexapro) 10 MG tablet, Take 1 tablet by mouth Daily., Disp: 90 tablet, Rfl: 1  •  fexofenadine (ALLEGRA) 180 MG tablet, Take 180 mg by mouth As Needed., Disp: , Rfl:   •  levonorgestrel (MIRENA, 52 MG,) 20 MCG/24HR IUD, 1 each by Intrauterine route 1 (One) Time., Disp: , Rfl:     Review of Systems   Constitutional: Negative for chills, fatigue and fever.   HENT: Negative for congestion, ear pain, rhinorrhea and sore throat.    Eyes: Negative for pain and redness.   Respiratory: Negative for cough, chest tightness and wheezing.    Cardiovascular: Negative for chest pain and leg swelling.   Gastrointestinal: Negative for abdominal pain, constipation, diarrhea, nausea and vomiting.   Endocrine: Negative for polydipsia and polyphagia.   Genitourinary: Negative for dysuria and hematuria.   Musculoskeletal: Negative for arthralgias and myalgias.   Skin: Negative for color change and rash.   Allergic/Immunologic: Negative.    Neurological: Negative for dizziness, weakness, light-headedness and headaches.   Hematological: Negative.    Psychiatric/Behavioral: Negative for confusion, decreased concentration, dysphoric mood, sleep disturbance and suicidal ideas. The patient is not nervous/anxious.        Objective   Vitals:     "04/29/21 1524 04/29/21 1553   BP: 136/78 128/76   Pulse: 84    Temp: 98.2 °F (36.8 °C)    SpO2: 98%    Weight: 83.9 kg (185 lb)    Height: 167.6 cm (66\")      Body mass index is 29.86 kg/m².  Physical Exam  Constitutional:       General: She is not in acute distress.     Appearance: Normal appearance. She is well-developed. She is obese.   HENT:      Head: Normocephalic and atraumatic.      Right Ear: Tympanic membrane, ear canal and external ear normal.      Left Ear: Tympanic membrane, ear canal and external ear normal.      Mouth/Throat:      Mouth: Mucous membranes are moist.      Pharynx: Oropharynx is clear.   Eyes:      Conjunctiva/sclera: Conjunctivae normal.      Pupils: Pupils are equal, round, and reactive to light.   Neck:      Thyroid: No thyromegaly.   Cardiovascular:      Rate and Rhythm: Normal rate and regular rhythm.      Heart sounds: No murmur heard.     Pulmonary:      Effort: Pulmonary effort is normal.      Breath sounds: Normal breath sounds. No wheezing.   Abdominal:      General: Bowel sounds are normal.      Palpations: Abdomen is soft.      Tenderness: There is no abdominal tenderness.   Musculoskeletal:         General: Normal range of motion.      Cervical back: Neck supple.   Lymphadenopathy:      Cervical: No cervical adenopathy.   Skin:     General: Skin is warm and dry.   Neurological:      Mental Status: She is alert and oriented to person, place, and time.   Psychiatric:         Mood and Affect: Mood normal.         Behavior: Behavior normal.           Assessment/Plan   Diagnoses and all orders for this visit:    1. Routine health maintenance (Primary)    2. Adjustment disorder with anxiety  -     escitalopram (Lexapro) 10 MG tablet; Take 1 tablet by mouth Daily.  Dispense: 90 tablet; Refill: 1    3. Hyperlipidemia, unspecified hyperlipidemia type  -     Lipid Panel; Future    4. BMI 29.0-29.9,adult    5. Elevated blood pressure reading  -     TSH; Future  -     Lipid Panel; " Future  -     CBC & Differential; Future  -     Comprehensive Metabolic Panel; Future    6. Need for hepatitis C screening test  -     Hepatitis C Antibody; Future    7. Seasonal allergic rhinitis due to pollen               Discussed the importance of maintaining a healthy weight and getting regular exercise.  Educated patient on the benefits of healthy diet.  Advise follow-up annually for wellness exams.    Patient Instructions   Do not stop the escitalopram without consultation.     Make sure to use your nasal steroids for your allergies and you can switch the Zyrtec or Allegra to the morning with the Chlor-Trimeton or Benadryl at night as needed.

## 2021-04-29 NOTE — PATIENT INSTRUCTIONS
Do not stop the escitalopram without consultation.     Make sure to use your nasal steroids for your allergies and you can switch the Zyrtec or Allegra to the morning with the Chlor-Trimeton or Benadryl at night as needed.

## 2021-05-03 ENCOUNTER — LAB (OUTPATIENT)
Dept: FAMILY MEDICINE CLINIC | Facility: CLINIC | Age: 36
End: 2021-05-03

## 2021-05-03 VITALS — TEMPERATURE: 98.2 F

## 2021-05-06 DIAGNOSIS — R79.89 ELEVATED LFTS: Primary | ICD-10-CM

## 2021-06-24 ENCOUNTER — TELEPHONE (OUTPATIENT)
Dept: FAMILY MEDICINE CLINIC | Facility: CLINIC | Age: 36
End: 2021-06-24

## 2021-06-24 NOTE — TELEPHONE ENCOUNTER
Caller: Bren Lynch    Relationship to patient: Self    Best call back number: [phone]    Type of visit: LABS    Requested date: 06/30/021 AT 12:30 OR 12:40    If rescheduling, when is the original appointment: 06/30/2021 AT 1PM     Additional notes: ATTEMPTED TO WARM TRANSFER. 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 CALLBACK REQUESTED FOR CONFIRMATION IF AVAILABLE

## 2021-06-25 ENCOUNTER — LAB (OUTPATIENT)
Dept: FAMILY MEDICINE CLINIC | Facility: CLINIC | Age: 36
End: 2021-06-25

## 2021-06-25 VITALS — TEMPERATURE: 98.6 F

## 2021-08-09 DIAGNOSIS — R30.0 DYSURIA: Primary | ICD-10-CM

## 2021-08-09 RX ORDER — NITROFURANTOIN 25; 75 MG/1; MG/1
100 CAPSULE ORAL 2 TIMES DAILY
Qty: 14 CAPSULE | Refills: 0 | Status: SHIPPED | OUTPATIENT
Start: 2021-08-09 | End: 2021-08-14

## 2021-08-13 ENCOUNTER — OFFICE VISIT (OUTPATIENT)
Dept: FAMILY MEDICINE CLINIC | Facility: CLINIC | Age: 36
End: 2021-08-13

## 2021-08-13 VITALS
TEMPERATURE: 98.2 F | DIASTOLIC BLOOD PRESSURE: 76 MMHG | WEIGHT: 190 LBS | HEIGHT: 66 IN | OXYGEN SATURATION: 97 % | BODY MASS INDEX: 30.53 KG/M2 | SYSTOLIC BLOOD PRESSURE: 118 MMHG | HEART RATE: 68 BPM

## 2021-08-13 DIAGNOSIS — J02.9 SORE THROAT: Primary | ICD-10-CM

## 2021-08-13 LAB
EXPIRATION DATE: NORMAL
INTERNAL CONTROL: NORMAL
Lab: 3708
S PYO AG THROAT QL: NEGATIVE

## 2021-08-13 PROCEDURE — 99213 OFFICE O/P EST LOW 20 MIN: CPT | Performed by: FAMILY MEDICINE

## 2021-08-13 PROCEDURE — 87880 STREP A ASSAY W/OPTIC: CPT | Performed by: FAMILY MEDICINE

## 2021-08-13 RX ORDER — CETIRIZINE HYDROCHLORIDE 10 MG/1
10 TABLET ORAL DAILY
COMMUNITY

## 2021-08-13 NOTE — PROGRESS NOTES
"Chief Complaint  Sore Throat    Subjective          Bren Lynch presents to [facility]  Patient is here today with some new symptoms.  She started today with a scratchy throat which seemed to progressively get worse throughout the day.  Her son who is 7 years old was diagnosed with strep throat on August 8.  He was also tested for Covid and that was negative.  The patient has not yet gotten her COVID-19 vaccine.  Patient has no appetite but denies any other symptoms such as fever, no headaches, no nausea.  The patient has not had strep throat since she was a child.      Objective   Vital Signs:   /76   Pulse 68   Temp 98.2 °F (36.8 °C)   Ht 167.6 cm (66\")   Wt 86.2 kg (190 lb)   SpO2 97%   BMI 30.67 kg/m²     Physical Exam  Vitals and nursing note reviewed.   Constitutional:       Appearance: Normal appearance. She is well-developed and normal weight.   HENT:      Head: Normocephalic and atraumatic.      Right Ear: Tympanic membrane, ear canal and external ear normal.      Left Ear: Tympanic membrane, ear canal and external ear normal.      Mouth/Throat:      Mouth: Mucous membranes are moist.      Pharynx: Posterior oropharyngeal erythema present. No oropharyngeal exudate.   Eyes:      General: No scleral icterus.     Conjunctiva/sclera: Conjunctivae normal.   Cardiovascular:      Rate and Rhythm: Normal rate and regular rhythm.      Heart sounds: Normal heart sounds.   Pulmonary:      Effort: Pulmonary effort is normal.      Breath sounds: Normal breath sounds.   Abdominal:      General: Bowel sounds are normal.      Palpations: Abdomen is soft.   Musculoskeletal:         General: Normal range of motion.      Cervical back: Normal range of motion and neck supple.   Skin:     General: Skin is warm and dry.      Capillary Refill: Capillary refill takes less than 2 seconds.      Findings: No rash.   Neurological:      Mental Status: She is alert and oriented to person, " place, and time.   Psychiatric:         Mood and Affect: Mood normal.         Behavior: Behavior normal.         Thought Content: Thought content normal.         Judgment: Judgment normal.        Result Review :   The following data was reviewed by: Dee Lucia DO on 08/13/2021:  Common labs    Common Labsle 5/3/21 5/3/21 5/3/21 6/25/21    0818 0818 0818    Glucose   89    BUN   8    Creatinine   0.75    eGFR Non  Am   87    eGFR African Am   105    Sodium   141    Potassium   4.3    Chloride   105    Calcium   9.2    Total Protein   6.5 7.1   Albumin   4.00 4.2   Total Bilirubin   0.3 0.3   Alkaline Phosphatase   74 75   AST (SGOT)   85 (A) 30   ALT (SGPT)   100 (A) 42 (A)   WBC  4.80     Hemoglobin  14.2     Hematocrit  42.7     Platelets  181     Total Cholesterol 181      Triglycerides 59      HDL Cholesterol 50      LDL Cholesterol  120 (A)      (A) Abnormal value       Comments are available for some flowsheets but are not being displayed.           TSH    TSH 5/3/21   TSH 1.550           Strep    Common Labsle 8/13/21   POC Strep A, Molecular Negative                     Assessment and Plan    Diagnoses and all orders for this visit:    1. Sore throat (Primary)  -     POCT rapid strep A    Reassurance given that the rapid strep in the office was negative.  She may take over-the-counter Tylenol or ibuprofen for any discomfort.  She may also try over-the-counter antihistamines.  I advised the patient to monitor symptoms closely and if they worsen or do not improve over the next couple days then she should follow-up.  If she develops any new symptoms she should follow-up.      Follow Up   Return if symptoms worsen or fail to improve.  Patient was given instructions and counseling regarding her condition or for health maintenance advice. Please see specific information pulled into the AVS if appropriate.

## 2021-09-10 RX ORDER — AZITHROMYCIN 250 MG/1
TABLET, FILM COATED ORAL
Qty: 6 TABLET | Refills: 0 | Status: SHIPPED | OUTPATIENT
Start: 2021-09-10 | End: 2021-09-15

## 2021-09-13 PROCEDURE — 96375 TX/PRO/DX INJ NEW DRUG ADDON: CPT

## 2021-09-13 PROCEDURE — 80053 COMPREHEN METABOLIC PANEL: CPT | Performed by: EMERGENCY MEDICINE

## 2021-09-13 PROCEDURE — 83605 ASSAY OF LACTIC ACID: CPT | Performed by: EMERGENCY MEDICINE

## 2021-09-13 PROCEDURE — 84145 PROCALCITONIN (PCT): CPT | Performed by: EMERGENCY MEDICINE

## 2021-09-13 PROCEDURE — 99211 OFF/OP EST MAY X REQ PHY/QHP: CPT

## 2021-09-13 PROCEDURE — 81001 URINALYSIS AUTO W/SCOPE: CPT | Performed by: EMERGENCY MEDICINE

## 2021-09-13 PROCEDURE — 99283 EMERGENCY DEPT VISIT LOW MDM: CPT

## 2021-09-13 PROCEDURE — 87040 BLOOD CULTURE FOR BACTERIA: CPT | Performed by: NURSE PRACTITIONER

## 2021-09-13 PROCEDURE — 96374 THER/PROPH/DIAG INJ IV PUSH: CPT

## 2021-09-13 PROCEDURE — 36415 COLL VENOUS BLD VENIPUNCTURE: CPT

## 2021-09-13 NOTE — ED TRIAGE NOTES
Pt is covid positive.  Fever to 104 - took ibu at 1530.  Has cough when she takes a deep breath    Patient was placed in face mask during first look triage.  Patient was wearing a face mask throughout encounter.  I wore personal protective equipment throughout the encounter.  Hand hygiene was performed before and after patient encounter.

## 2021-09-14 ENCOUNTER — APPOINTMENT (OUTPATIENT)
Dept: GENERAL RADIOLOGY | Facility: HOSPITAL | Age: 36
End: 2021-09-14

## 2021-09-14 ENCOUNTER — HOSPITAL ENCOUNTER (EMERGENCY)
Facility: HOSPITAL | Age: 36
Discharge: HOME | End: 2021-09-14
Attending: EMERGENCY MEDICINE | Admitting: EMERGENCY MEDICINE

## 2021-09-14 VITALS
HEART RATE: 103 BPM | RESPIRATION RATE: 16 BRPM | OXYGEN SATURATION: 96 % | DIASTOLIC BLOOD PRESSURE: 79 MMHG | SYSTOLIC BLOOD PRESSURE: 124 MMHG | TEMPERATURE: 98.7 F

## 2021-09-14 DIAGNOSIS — J12.82 PNEUMONIA DUE TO COVID-19 VIRUS: Primary | ICD-10-CM

## 2021-09-14 DIAGNOSIS — U07.1 PNEUMONIA DUE TO COVID-19 VIRUS: Primary | ICD-10-CM

## 2021-09-14 DIAGNOSIS — R11.2 NON-INTRACTABLE VOMITING WITH NAUSEA, UNSPECIFIED VOMITING TYPE: ICD-10-CM

## 2021-09-14 LAB
ALBUMIN SERPL-MCNC: 4.3 G/DL (ref 3.5–5.2)
ALBUMIN/GLOB SERPL: 1.3 G/DL
ALP SERPL-CCNC: 69 U/L (ref 39–117)
ALT SERPL W P-5'-P-CCNC: 51 U/L (ref 1–33)
ANION GAP SERPL CALCULATED.3IONS-SCNC: 14.6 MMOL/L (ref 5–15)
AST SERPL-CCNC: 41 U/L (ref 1–32)
BACTERIA UR QL AUTO: ABNORMAL /HPF
BILIRUB SERPL-MCNC: 0.5 MG/DL (ref 0–1.2)
BILIRUB UR QL STRIP: NEGATIVE
BUN SERPL-MCNC: 11 MG/DL (ref 6–20)
BUN/CREAT SERPL: 13.8 (ref 7–25)
CALCIUM SPEC-SCNC: 8.8 MG/DL (ref 8.6–10.5)
CHLORIDE SERPL-SCNC: 102 MMOL/L (ref 98–107)
CLARITY UR: CLEAR
CO2 SERPL-SCNC: 21.4 MMOL/L (ref 22–29)
COLOR UR: ABNORMAL
CREAT SERPL-MCNC: 0.8 MG/DL (ref 0.57–1)
D-LACTATE SERPL-SCNC: 1.6 MMOL/L (ref 0.5–2)
GFR SERPL CREATININE-BSD FRML MDRD: 81 ML/MIN/1.73
GLOBULIN UR ELPH-MCNC: 3.4 GM/DL
GLUCOSE SERPL-MCNC: 88 MG/DL (ref 65–99)
GLUCOSE UR STRIP-MCNC: NEGATIVE MG/DL
HCG SERPL QL: NEGATIVE
HGB UR QL STRIP.AUTO: NEGATIVE
HYALINE CASTS UR QL AUTO: ABNORMAL /LPF
KETONES UR QL STRIP: ABNORMAL
LEUKOCYTE ESTERASE UR QL STRIP.AUTO: NEGATIVE
MUCOUS THREADS URNS QL MICRO: ABNORMAL /HPF
NITRITE UR QL STRIP: NEGATIVE
PH UR STRIP.AUTO: 5.5 [PH] (ref 5–8)
POTASSIUM SERPL-SCNC: 3.8 MMOL/L (ref 3.5–5.2)
PROCALCITONIN SERPL-MCNC: 0.05 NG/ML (ref 0–0.25)
PROT SERPL-MCNC: 7.7 G/DL (ref 6–8.5)
PROT UR QL STRIP: ABNORMAL
RBC # UR: ABNORMAL /HPF
REF LAB TEST METHOD: ABNORMAL
SARS-COV-2 RNA PNL SPEC NAA+PROBE: DETECTED
SODIUM SERPL-SCNC: 138 MMOL/L (ref 136–145)
SP GR UR STRIP: 1.03 (ref 1–1.03)
SQUAMOUS #/AREA URNS HPF: ABNORMAL /HPF
TRANS CELLS #/AREA URNS HPF: ABNORMAL /HPF
UROBILINOGEN UR QL STRIP: ABNORMAL
WBC UR QL AUTO: ABNORMAL /HPF

## 2021-09-14 PROCEDURE — 25010000002 ONDANSETRON PER 1 MG: Performed by: EMERGENCY MEDICINE

## 2021-09-14 PROCEDURE — 87635 SARS-COV-2 COVID-19 AMP PRB: CPT | Performed by: EMERGENCY MEDICINE

## 2021-09-14 PROCEDURE — 96375 TX/PRO/DX INJ NEW DRUG ADDON: CPT

## 2021-09-14 PROCEDURE — 84703 CHORIONIC GONADOTROPIN ASSAY: CPT | Performed by: EMERGENCY MEDICINE

## 2021-09-14 PROCEDURE — 87040 BLOOD CULTURE FOR BACTERIA: CPT | Performed by: EMERGENCY MEDICINE

## 2021-09-14 PROCEDURE — 96374 THER/PROPH/DIAG INJ IV PUSH: CPT

## 2021-09-14 PROCEDURE — 25010000002 DEXAMETHASONE PER 1 MG: Performed by: EMERGENCY MEDICINE

## 2021-09-14 PROCEDURE — 71045 X-RAY EXAM CHEST 1 VIEW: CPT

## 2021-09-14 RX ORDER — ONDANSETRON 2 MG/ML
4 INJECTION INTRAMUSCULAR; INTRAVENOUS ONCE
Status: COMPLETED | OUTPATIENT
Start: 2021-09-14 | End: 2021-09-14

## 2021-09-14 RX ORDER — GUAIFENESIN AND CODEINE PHOSPHATE 100; 10 MG/5ML; MG/5ML
5 SOLUTION ORAL 3 TIMES DAILY PRN
Qty: 118 ML | Refills: 0 | Status: SHIPPED | OUTPATIENT
Start: 2021-09-14 | End: 2021-12-27

## 2021-09-14 RX ORDER — SODIUM CHLORIDE 0.9 % (FLUSH) 0.9 %
10 SYRINGE (ML) INJECTION AS NEEDED
Status: DISCONTINUED | OUTPATIENT
Start: 2021-09-14 | End: 2021-09-14 | Stop reason: HOSPADM

## 2021-09-14 RX ORDER — PREDNISONE 50 MG/1
50 TABLET ORAL DAILY
Qty: 5 TABLET | Refills: 0 | Status: SHIPPED | OUTPATIENT
Start: 2021-09-14 | End: 2022-05-25

## 2021-09-14 RX ORDER — ONDANSETRON 8 MG/1
8 TABLET, ORALLY DISINTEGRATING ORAL EVERY 8 HOURS PRN
Qty: 12 TABLET | Refills: 0 | Status: SHIPPED | OUTPATIENT
Start: 2021-09-14 | End: 2022-05-25

## 2021-09-14 RX ORDER — DEXAMETHASONE SODIUM PHOSPHATE 10 MG/ML
6 INJECTION INTRAMUSCULAR; INTRAVENOUS ONCE
Status: COMPLETED | OUTPATIENT
Start: 2021-09-14 | End: 2021-09-14

## 2021-09-14 RX ADMIN — DEXAMETHASONE SODIUM PHOSPHATE 6 MG: 10 INJECTION INTRAMUSCULAR; INTRAVENOUS at 03:35

## 2021-09-14 RX ADMIN — ONDANSETRON 4 MG: 2 INJECTION INTRAMUSCULAR; INTRAVENOUS at 01:03

## 2021-09-14 RX ADMIN — SODIUM CHLORIDE 1000 ML: 9 INJECTION, SOLUTION INTRAVENOUS at 01:01

## 2021-09-14 NOTE — ED PROVIDER NOTES
EMERGENCY DEPARTMENT ENCOUNTER    CHIEF COMPLAINT  Chief Complaint: Fever/cough  History given by: Patient  History limited by: None  Room Number:   PMD: Kehrer, Meredith Lea, MD      HPI:  Pt is a 36 y.o. female who presents complaining of 1 week of fevers and chills.  The patient states that she was diagnosed as Covid +1-week ago.  She comes in today because she developed an associated nonproductive cough as well as nausea and vomiting secondary to eye drainage and congestion.  She states that her T-max has been 104.  She denies chest pain, shortness of breath, abdominal pain, diarrhea, or dizziness.    Onset: gradual  Radiation: none  Quality: fever/cough  Intensity/Severity: moderate  Progression: worsening  Aggravating Factors: none  Alleviating Factors: tylenol/ibuprofen  Previous Episodes: none  Treatment before arrival: tylenol/ibuprofen    PAST MEDICAL HISTORY  Active Ambulatory Problems     Diagnosis Date Noted   • Well female exam with routine gynecological exam 2017   • IUD (intrauterine device) in place 2017   • Family history of breast cancer 2017   • Routine health maintenance 2020     Resolved Ambulatory Problems     Diagnosis Date Noted   • No Resolved Ambulatory Problems     No Additional Past Medical History       PAST SURGICAL HISTORY  Past Surgical History:   Procedure Laterality Date   •  SECTION     •  SECTION     • TONSILLECTOMY         FAMILY HISTORY  Family History   Problem Relation Age of Onset   • Breast cancer Mother 58   • Hypertension Mother    • Hyperlipidemia Mother    • Other Mother         BLOOD CLOTS   • Liver cancer Mother 65   • Bone cancer Mother 65   • Stroke Maternal Grandfather    • Lung cancer Maternal Grandfather    • Emphysema Maternal Grandfather    • Uterine cancer Paternal Grandmother 60   • Stroke Paternal Grandfather    • Prostate cancer Paternal Grandfather    • Hypertension Father    • Hyperlipidemia Father     • Osteoporosis Maternal Grandmother    • Cleft palate Cousin    • Mental retardation Cousin    • Von Willebrand disease Cousin    • Lung cancer Maternal Uncle 56   • Brain cancer Maternal Uncle 56   • Bone cancer Maternal Uncle 56   • Ovarian cancer Neg Hx    • Colon cancer Neg Hx    • Melanoma Neg Hx        SOCIAL HISTORY  Social History     Socioeconomic History   • Marital status:      Spouse name: MARIA D   • Number of children: 2   • Years of education: 12   • Highest education level: Not on file   Tobacco Use   • Smoking status: Never Smoker   • Smokeless tobacco: Never Used   Substance and Sexual Activity   • Alcohol use: No   • Drug use: No   • Sexual activity: Yes     Partners: Male     Birth control/protection: I.U.D.     Comment: Mirena 2/2017       ALLERGIES  Patient has no known allergies.    REVIEW OF SYSTEMS  Review of Systems   Constitutional: Positive for chills and fever.   HENT: Negative for sore throat.    Eyes: Negative.    Respiratory: Positive for cough. Negative for shortness of breath.    Cardiovascular: Negative for chest pain.   Gastrointestinal: Positive for nausea and vomiting. Negative for abdominal pain and diarrhea.   Genitourinary: Negative for dysuria.   Musculoskeletal: Negative for neck pain.   Skin: Negative for rash.   Allergic/Immunologic: Negative.    Neurological: Negative for weakness, numbness and headaches.   Hematological: Negative.    Psychiatric/Behavioral: Negative.    All other systems reviewed and are negative.      PHYSICAL EXAM  ED Triage Vitals   Temp Heart Rate Resp BP SpO2   09/13/21 1656 09/13/21 1656 09/13/21 1656 09/13/21 1744 09/13/21 1656   98.7 °F (37.1 °C) 119 16 103/68 94 %      Temp src Heart Rate Source Patient Position BP Location FiO2 (%)   09/13/21 1656 09/13/21 1656 09/13/21 1744 09/13/21 1744 --   Tympanic Monitor Sitting Right arm        Physical Exam  Vitals and nursing note reviewed.   Constitutional:       General: She is not in acute  distress.  HENT:      Head: Normocephalic and atraumatic.   Eyes:      Pupils: Pupils are equal, round, and reactive to light.   Cardiovascular:      Rate and Rhythm: Normal rate and regular rhythm.      Heart sounds: Normal heart sounds.   Pulmonary:      Effort: Pulmonary effort is normal. No respiratory distress.      Breath sounds: Normal breath sounds.   Abdominal:      Palpations: Abdomen is soft.      Tenderness: There is no abdominal tenderness. There is no guarding or rebound.   Musculoskeletal:         General: Normal range of motion.      Cervical back: Normal range of motion and neck supple.   Skin:     General: Skin is warm and dry.      Findings: No rash.   Neurological:      Mental Status: She is alert and oriented to person, place, and time.      Sensory: Sensation is intact.   Psychiatric:         Mood and Affect: Mood and affect normal.         LAB RESULTS  Lab Results (last 24 hours)     Procedure Component Value Units Date/Time    Comprehensive Metabolic Panel [[id number]]  (Abnormal) Collected: 09/13/21 2341    Specimen: Blood from Arm, Left Updated: 09/14/21 0043     Glucose 88 mg/dL      BUN 11 mg/dL      Creatinine 0.80 mg/dL      Sodium 138 mmol/L      Potassium 3.8 mmol/L      Chloride 102 mmol/L      CO2 21.4 mmol/L      Calcium 8.8 mg/dL      Total Protein 7.7 g/dL      Albumin 4.30 g/dL      ALT (SGPT) 51 U/L      AST (SGOT) 41 U/L      Alkaline Phosphatase 69 U/L      Total Bilirubin 0.5 mg/dL      eGFR Non African Amer 81 mL/min/1.73      Globulin 3.4 gm/dL      A/G Ratio 1.3 g/dL      BUN/Creatinine Ratio 13.8     Anion Gap 14.6 mmol/L     Narrative:      GFR Normal >60  Chronic Kidney Disease <60  Kidney Failure <15      Blood Culture - Blood, Arm, Left [[id number]] Collected: 09/13/21 2341    Specimen: Blood from Arm, Left Updated: 09/13/21 2353    Lactic Acid, Plasma [[id number]]  (Normal) Collected: 09/13/21 2341    Specimen: Blood from Arm, Left Updated: 09/14/21 0015     Lactate  "1.6 mmol/L     Procalcitonin [[id number]]  (Normal) Collected: 09/13/21 2341    Specimen: Blood from Arm, Left Updated: 09/14/21 0031     Procalcitonin 0.05 ng/mL     Narrative:      As a Marker for Sepsis (Non-Neonates):     1. <0.5 ng/mL represents a low risk of severe sepsis and/or septic shock.  2. >2 ng/mL represents a high risk of severe sepsis and/or septic shock.    As a Marker for Lower Respiratory Tract Infections that require antibiotic therapy:  PCT on Admission     Antibiotic Therapy             6-12 Hrs later  >0.5                          Strongly Recommended            >0.25 - <0.5             Recommended  0.1 - 0.25                  Discouraged                       Remeasure/reassess PCT  <0.1                         Strongly Discouraged         Remeasure/reassess PCT      As 28 day mortality risk marker: \"Change in Procalcitonin Result\" (>80% or <=80%) if Day 0 (or Day 1) and Day 4 values are available. Refer to http://[email or web address]-pct-[email or web address]/    Change in PCT <=80 %   A decrease of PCT levels below or equal to 80% defines a positive change in PCT test result representing a higher risk for 28-day all-cause mortality of patients diagnosed with severe sepsis or septic shock.    Change in PCT >80 %   A decrease of PCT levels of more than 80% defines a negative change in PCT result representing a lower risk for 28-day all-cause mortality of patients diagnosed with severe sepsis or septic shock.              Results may be falsely decreased if patient taking Biotin.     Urinalysis With Microscopic If Indicated (No Culture) - Urine, Clean Catch [[id number]]  (Abnormal) Collected: 09/13/21 2347    Specimen: Urine, Clean Catch Updated: 09/14/21 0021     Color, UA Dark Yellow     Appearance, UA Clear     pH, UA 5.5     Specific Gravity, UA 1.028     Glucose, UA Negative     Ketones, UA 80 mg/dL (3+)     Bilirubin, UA Negative     Blood, UA Negative     Protein, UA 30 mg/dL (1+)     Leuk Esterase, UA " Negative     Nitrite, UA Negative     Urobilinogen, UA 1.0 E.U./dL    Urinalysis, Microscopic Only - Urine, Clean Catch [[id number]]  (Abnormal) Collected: 09/13/21 2347    Specimen: Urine, Clean Catch Updated: 09/14/21 0044     RBC, UA 0-2 /HPF      WBC, UA 0-2 /HPF      Bacteria, UA Trace /HPF      Squamous Epithelial Cells, UA None Seen /HPF      Transitional Epithelial Cells, UA 0-2 /HPF      Hyaline Casts, UA 0-2 /LPF      Mucus, UA Moderate/2+ /HPF      Methodology Manual Light Microscopy    COVID-19,[facility] IN-HOUSE CEPHEID/NAKUL NP SWAB IN TRANSPORT MEDIA 8-12 HR TAT - Swab, Nasopharynx [[id number]]  (Abnormal) Collected: 09/14/21 0044    Specimen: Swab from Nasopharynx Updated: 09/14/21 0154     COVID19 Detected    Narrative:      Fact sheet for providers: https://www.fda.gov/media/157394/download    Fact sheet for patients: https://www.fda.gov/media/862488/download    Test performed by PCR.    Blood Culture - Blood, Arm, Left [[id number]] Collected: 09/14/21 0103    Specimen: Blood from Arm, Left Updated: 09/14/21 0122    hCG, Serum, Qualitative [[id number]]  (Normal) Collected: 09/14/21 0103    Specimen: Blood Updated: 09/14/21 0138     HCG Qualitative Negative          I ordered the above labs and reviewed the results    RADIOLOGY  XR Chest 1 View   Final Result         Electronically signed by Vaishali Leyva M.D. on 09-14-21 at 0128           I ordered the above noted radiological studies. Interpreted by radiologist. Reviewed by me in PACS.       PROCEDURES  Procedures      PROGRESS AND CONSULTS     The patient was wearing a facemask upon entrance into the room and remained in such throughout their visit.  I was wearing PPE including a facemask, eye protection, as well as gloves at any point entering the room and throughout the visit    0250  On reevaluation, the patient is resting comfortably with stable oxygen saturations.  We did walk her in the emergency room and her oxygen saturations never fell below 94%  on room air.  I did inform her that she will be stable for discharge but she is to return immediately if shortness of breath or home oxygen saturations fall below 90% on room air.  The patient is in agreement with the plan and all questions have been answered.      MEDICAL DECISION MAKING  Results were reviewed/discussed with the patient and they were also made aware of online access. Pt also made aware that some labs, such as cultures, will not be resulted during ER visit and follow up with PMD is necessary.     MDM  Number of Diagnoses or Management Options     Amount and/or Complexity of Data Reviewed  Clinical lab tests: ordered and reviewed  Tests in the radiology section of CPT®: ordered and reviewed  Tests in the medicine section of CPT®: ordered and reviewed  Review and summarize past medical records: yes (There are no previous emergency room records available for review)  Independent visualization of images, tracings, or specimens: yes (Chest x-ray showing mild bibasilar infiltrates)           DIAGNOSIS  Final diagnoses:   Pneumonia due to COVID-19 virus   Non-intractable vomiting with nausea, unspecified vomiting type       DISPOSITION  DISCHARGE    Patient discharged in stable condition.    Reviewed implications of results, diagnosis, meds, responsibility to follow up, warning signs and symptoms of possible worsening, potential complications and reasons to return to ER.    Patient/Family voiced understanding of above instructions.    Discussed plan for discharge, as there is no emergent indication for admission. Patient referred to primary care provider for BP management due to today's BP. Pt/family is agreeable and understands need for follow up and repeat testing.  Pt is aware that discharge does not mean that nothing is wrong but it indicates no emergency is present that requires admission and they must continue care with follow-up as given below or physician of their choice.     FOLLOW-UP  Kehrer,  Ninoska Cherry MD  [address]  [address]  [facility] [address] [phone]    Schedule an appointment as soon as possible for a visit           Medication List      New Prescriptions    guaiFENesin-codeine 100-10 MG/5ML liquid  Commonly known as: GUAIFENESIN AC  Take 5 mL by mouth 3 (Three) Times a Day As Needed for Cough.     ondansetron ODT 8 MG disintegrating tablet  Commonly known as: Zofran ODT  Place 1 tablet on the tongue Every 8 (Eight) Hours As Needed for Nausea or Vomiting.     predniSONE 50 MG tablet  Commonly known as: DELTASONE  Take 1 tablet by mouth Daily.           Where to Get Your Medications      These medications were sent to [id number] - [place], [place] - [address] - [phone]  - [phone] FX  [address] [address]    Phone: [phone]   · guaiFENesin-codeine 100-10 MG/5ML liquid  · ondansetron ODT 8 MG disintegrating tablet  · predniSONE 50 MG tablet           Latest Documented Vital Signs:  As of 06:28 EDT  BP- 124/79 HR- 103 Temp- 98.7 °F (37.1 °C) (Tympanic) O2 sat- 96%         Wallace Zavala MD  09/14/21 4214

## 2021-09-14 NOTE — ED NOTES
Pt. Was able to ambulate approximately 250 ft.without s/s of respiratory distress. Pt. Maintained 02 sats of 93 % on room air, HR of 120. Pt. Back in bed. HR.110, 02 sats 94, complains that she feels S.O.A at this time but no other s/s.     This ERT wore appropriate PPE throughout the entire encounter with this pt.       MD notified.      Milton Adrian  09/14/21 0248

## 2021-09-14 NOTE — DISCHARGE INSTRUCTIONS
Return to the emergency room immediately if significant shortness of breath, oxygen saturations at home falls below 90%, or any other concerns.

## 2021-09-15 ENCOUNTER — TELEPHONE (OUTPATIENT)
Dept: FAMILY MEDICINE CLINIC | Facility: CLINIC | Age: 36
End: 2021-09-15

## 2021-09-15 NOTE — TELEPHONE ENCOUNTER
That should be fine.  Her x-ray is consistent with viral pneumonia.  Have her make a virtual appointment to follow-up with me over the next few days if she would like.  I would want to see her in a few weeks to reexamine her lungs.

## 2021-09-15 NOTE — TELEPHONE ENCOUNTER
Caller: Bren Lynch    Relationship: Self    Best call back number: [phone]     What is the best time to reach you: ANY TIME    Who are you requesting to speak with (clinical staff, provider,  specific staff member): CLINICAL    What was the call regarding: PATIENT WAS IN THE HOSPITAL FOR COVID AND THEY DID A CHEST X-RAY THAT SHOWED PNEUMONIA. PATIENT WAS ONLY GIVEN 5 PILLS OF PREDNISONE AND WAS WANTING TO ASK DR. KEHRER IF SHE THOUGHT THIS WOULD BE ENOUGH OR IF SHE WOULD NEED MORE.  PATIENT HAS ASKED DR. KEHRER TO LOOK AT HER CHEST X-RAY RESULTS AND CALL HER BACK.

## 2021-09-19 LAB
BACTERIA SPEC AEROBE CULT: NORMAL
BACTERIA SPEC AEROBE CULT: NORMAL

## 2021-11-03 DIAGNOSIS — F43.22 ADJUSTMENT DISORDER WITH ANXIETY: Chronic | ICD-10-CM

## 2021-11-03 RX ORDER — ESCITALOPRAM OXALATE 10 MG/1
10 TABLET ORAL DAILY
Qty: 90 TABLET | Refills: 1 | Status: SHIPPED | OUTPATIENT
Start: 2021-11-03 | End: 2021-12-27 | Stop reason: SDUPTHER

## 2021-12-20 DIAGNOSIS — B02.22 TRIGEMINAL HERPES ZOSTER: Primary | ICD-10-CM

## 2021-12-20 RX ORDER — VALACYCLOVIR HYDROCHLORIDE 1 G/1
1000 TABLET, FILM COATED ORAL 3 TIMES DAILY
Qty: 30 TABLET | Refills: 1 | Status: SHIPPED | OUTPATIENT
Start: 2021-12-20 | End: 2022-05-25

## 2021-12-27 ENCOUNTER — OFFICE VISIT (OUTPATIENT)
Dept: FAMILY MEDICINE CLINIC | Facility: CLINIC | Age: 36
End: 2021-12-27

## 2021-12-27 VITALS
TEMPERATURE: 98.3 F | DIASTOLIC BLOOD PRESSURE: 70 MMHG | SYSTOLIC BLOOD PRESSURE: 128 MMHG | BODY MASS INDEX: 31.72 KG/M2 | HEIGHT: 66 IN | WEIGHT: 197.4 LBS | HEART RATE: 66 BPM | OXYGEN SATURATION: 98 %

## 2021-12-27 DIAGNOSIS — R79.89 ELEVATED LFTS: ICD-10-CM

## 2021-12-27 DIAGNOSIS — R63.5 WEIGHT GAIN: Primary | ICD-10-CM

## 2021-12-27 DIAGNOSIS — L65.9 HAIR LOSS: ICD-10-CM

## 2021-12-27 DIAGNOSIS — F43.22 ADJUSTMENT DISORDER WITH ANXIETY: ICD-10-CM

## 2021-12-27 PROCEDURE — 99214 OFFICE O/P EST MOD 30 MIN: CPT | Performed by: NURSE PRACTITIONER

## 2021-12-27 RX ORDER — ESCITALOPRAM OXALATE 10 MG/1
10 TABLET ORAL DAILY
Qty: 90 TABLET | Refills: 1 | Status: SHIPPED | OUTPATIENT
Start: 2021-12-27 | End: 2023-02-17

## 2021-12-27 NOTE — PROGRESS NOTES
"Chief Complaint  Med Refill, Anxiety, and Depression    Subjective          Bren Lynch presents to [facility]  History of Present Illness      Patient is a patient of Dr. Meredith Kehrer.  This is my first time seeing this patient. She is here today for follow up on lexapro.    She feels like this is working well for her. Denies any issues with medication.  Likes where the dose is at 10mg.       Wants to be checked for thyroid.  Having hair loss.  Also having weight pain lately.         Objective   Vital Signs:   /70   Pulse 66   Temp 98.3 °F (36.8 °C)   Ht 167.6 cm (66\")   Wt 89.5 kg (197 lb 6.4 oz)   SpO2 98%   BMI 31.86 kg/m²     Physical Exam  Constitutional:       Appearance: Normal appearance.   Cardiovascular:      Rate and Rhythm: Normal rate and regular rhythm.      Pulses: Normal pulses.   Pulmonary:      Effort: Pulmonary effort is normal.      Breath sounds: Normal breath sounds.   Skin:     General: Skin is warm and dry.   Neurological:      Mental Status: She is alert.   Psychiatric:         Mood and Affect: Mood normal.         Behavior: Behavior normal.         Thought Content: Thought content normal.         Judgment: Judgment normal.        Result Review :                 Assessment and Plan    Diagnoses and all orders for this visit:    1. Weight gain (Primary)  -     CBC & Differential  -     Comprehensive Metabolic Panel  -     TSH  -     Vitamin B12    2. Adjustment disorder with anxiety  -     CBC & Differential  -     Comprehensive Metabolic Panel  -     TSH  -     Vitamin B12  -     escitalopram (LEXAPRO) 10 MG tablet; Take 1 tablet by mouth Daily.  Dispense: 90 tablet; Refill: 1    3. Elevated LFTs  -     CBC & Differential  -     Comprehensive Metabolic Panel  -     TSH  -     Vitamin B12    4. Hair loss  -     CBC & Differential  -     Comprehensive Metabolic Panel  -     TSH  -     Vitamin B12      We will continue Lexapro for anxiety.  If " any worsening mood notify provider.  If any suicidal homicidal ideations go to the ER.    We will check labs due to hair loss and weight gain.  Also rechecking LFTs due to elevated LFTs in the past.  Will call with results any changes needed plan of care.  She verbalized understanding.      Follow Up   No follow-ups on file.  Patient was given instructions and counseling regarding her condition or for health maintenance advice. Please see specific information pulled into the AVS if appropriate.

## 2021-12-28 LAB
ALBUMIN SERPL-MCNC: 4 G/DL (ref 3.8–4.8)
ALBUMIN/GLOB SERPL: 1.5 {RATIO} (ref 1.2–2.2)
ALP SERPL-CCNC: 65 IU/L (ref 44–121)
ALT SERPL-CCNC: 22 IU/L (ref 0–32)
AST SERPL-CCNC: 20 IU/L (ref 0–40)
BASOPHILS # BLD AUTO: 0 X10E3/UL (ref 0–0.2)
BASOPHILS NFR BLD AUTO: 1 %
BILIRUB SERPL-MCNC: 0.3 MG/DL (ref 0–1.2)
BUN SERPL-MCNC: 9 MG/DL (ref 6–20)
BUN/CREAT SERPL: 11 (ref 9–23)
CALCIUM SERPL-MCNC: 9 MG/DL (ref 8.7–10.2)
CHLORIDE SERPL-SCNC: 107 MMOL/L (ref 96–106)
CO2 SERPL-SCNC: 21 MMOL/L (ref 20–29)
CREAT SERPL-MCNC: 0.79 MG/DL (ref 0.57–1)
EOSINOPHIL # BLD AUTO: 0.1 X10E3/UL (ref 0–0.4)
EOSINOPHIL NFR BLD AUTO: 1 %
ERYTHROCYTE [DISTWIDTH] IN BLOOD BY AUTOMATED COUNT: 11.8 % (ref 11.7–15.4)
GLOBULIN SER CALC-MCNC: 2.6 G/DL (ref 1.5–4.5)
GLUCOSE SERPL-MCNC: 91 MG/DL (ref 65–99)
HCT VFR BLD AUTO: 43 % (ref 34–46.6)
HGB BLD-MCNC: 14.5 G/DL (ref 11.1–15.9)
IMM GRANULOCYTES # BLD AUTO: 0 X10E3/UL (ref 0–0.1)
IMM GRANULOCYTES NFR BLD AUTO: 0 %
LYMPHOCYTES # BLD AUTO: 2.1 X10E3/UL (ref 0.7–3.1)
LYMPHOCYTES NFR BLD AUTO: 35 %
MCH RBC QN AUTO: 30 PG (ref 26.6–33)
MCHC RBC AUTO-ENTMCNC: 33.7 G/DL (ref 31.5–35.7)
MCV RBC AUTO: 89 FL (ref 79–97)
MONOCYTES # BLD AUTO: 0.6 X10E3/UL (ref 0.1–0.9)
MONOCYTES NFR BLD AUTO: 10 %
NEUTROPHILS # BLD AUTO: 3.3 X10E3/UL (ref 1.4–7)
NEUTROPHILS NFR BLD AUTO: 53 %
PLATELET # BLD AUTO: 235 X10E3/UL (ref 150–450)
POTASSIUM SERPL-SCNC: 4 MMOL/L (ref 3.5–5.2)
PROT SERPL-MCNC: 6.6 G/DL (ref 6–8.5)
RBC # BLD AUTO: 4.84 X10E6/UL (ref 3.77–5.28)
SODIUM SERPL-SCNC: 140 MMOL/L (ref 134–144)
TSH SERPL DL<=0.005 MIU/L-ACNC: 1.08 UIU/ML (ref 0.45–4.5)
VIT B12 SERPL-MCNC: 355 PG/ML (ref 232–1245)
WBC # BLD AUTO: 6.2 X10E3/UL (ref 3.4–10.8)

## 2021-12-29 NOTE — PROGRESS NOTES
Please call the patient regarding her lab results.  Labs stable.  Vitamin D is on the low end of normal.  Recommend that she start supplement. May be oral 2000mg daily or injection weekly X4 then monthly.

## 2022-05-25 ENCOUNTER — OFFICE VISIT (OUTPATIENT)
Dept: OBSTETRICS AND GYNECOLOGY | Age: 37
End: 2022-05-25

## 2022-05-25 VITALS
SYSTOLIC BLOOD PRESSURE: 128 MMHG | DIASTOLIC BLOOD PRESSURE: 82 MMHG | BODY MASS INDEX: 31.24 KG/M2 | WEIGHT: 194.4 LBS | HEIGHT: 66 IN

## 2022-05-25 DIAGNOSIS — N89.8 VAGINAL DISCHARGE: ICD-10-CM

## 2022-05-25 DIAGNOSIS — Z12.4 SCREENING FOR MALIGNANT NEOPLASM OF CERVIX: ICD-10-CM

## 2022-05-25 DIAGNOSIS — Z11.51 SCREENING FOR HUMAN PAPILLOMAVIRUS (HPV): ICD-10-CM

## 2022-05-25 DIAGNOSIS — Z01.419 WELL FEMALE EXAM WITH ROUTINE GYNECOLOGICAL EXAM: Primary | ICD-10-CM

## 2022-05-25 DIAGNOSIS — Z11.3 SCREENING FOR STD (SEXUALLY TRANSMITTED DISEASE): ICD-10-CM

## 2022-05-25 PROBLEM — Z00.00 ROUTINE HEALTH MAINTENANCE: Status: RESOLVED | Noted: 2020-01-02 | Resolved: 2022-05-25

## 2022-05-25 PROCEDURE — 99395 PREV VISIT EST AGE 18-39: CPT | Performed by: OBSTETRICS & GYNECOLOGY

## 2022-05-25 NOTE — PROGRESS NOTES
[facility]   Obstetrics and Gynecology       2022    Patient: Bren Lynch          MR#:[id number]    History of Present Illness    Chief Complaint   Patient presents with   • Gynecologic Exam     Re-establishing. Last pap 19(-) no complaints today        37 y.o. female  who presents for annual exam.    The patient presents for regular exam without major complaints.  The patient is requesting routine screening for vaginosis    Studies reviewed:    No LMP recorded. Patient has had an implant.  Obstetric History:  OB History        2    Para   2    Term   2            AB        Living   2       SAB        IAB        Ectopic        Molar        Multiple        Live Births   2               Menstrual History:     No LMP recorded. Patient has had an implant.       Sexual History:       Social History     Substance and Sexual Activity   Sexual Activity Yes   • Partners: Male   • Birth control/protection: I.U.D.    Comment: Mirena 2017       ________________________________________  Patient Active Problem List   Diagnosis   • IUD (intrauterine device) in place   • Family history of breast cancer     History reviewed. No pertinent past medical history.  Past Surgical History:   Procedure Laterality Date   •  SECTION     •  SECTION     • TONSILLECTOMY       Social History     Tobacco Use   Smoking Status Never Smoker   Smokeless Tobacco Never Used     Family History   Problem Relation Age of Onset   • Breast cancer Mother 58   • Hypertension Mother    • Hyperlipidemia Mother    • Other Mother         BLOOD CLOTS   • Liver cancer Mother 65   • Bone cancer Mother 65   • Stroke Maternal Grandfather    • Lung cancer Maternal Grandfather    • Emphysema Maternal Grandfather    • Uterine cancer Paternal Grandmother 60   • Stroke Paternal Grandfather    • Prostate cancer Paternal Grandfather    • Hypertension Father    • Hyperlipidemia Father    •  "Osteoporosis Maternal Grandmother    • Cleft palate Cousin    • Mental retardation Cousin    • Von Willebrand disease Cousin    • Lung cancer Maternal Uncle 56   • Brain cancer Maternal Uncle 56   • Bone cancer Maternal Uncle 56   • Ovarian cancer Neg Hx    • Colon cancer Neg Hx    • Melanoma Neg Hx      Prior to Admission medications    Medication Sig Start Date End Date Taking? Authorizing Provider   cetirizine (zyrTEC) 10 MG tablet Take 10 mg by mouth Daily.   Yes Marie Warren MD   escitalopram (LEXAPRO) 10 MG tablet Take 1 tablet by mouth Daily. 12/27/21  Yes Wale Marrero APRN   levonorgestrel (MIRENA) 20 MCG/24HR IUD 1 each by Intrauterine route 1 (One) Time.   Yes ProviderMarie MD   fexofenadine (ALLEGRA) 180 MG tablet Take 180 mg by mouth As Needed.  5/25/22  Marie Warren MD   ondansetron ODT (Zofran ODT) 8 MG disintegrating tablet Place 1 tablet on the tongue Every 8 (Eight) Hours As Needed for Nausea or Vomiting. 9/14/21 5/25/22  Wallace Zavala MD   predniSONE (DELTASONE) 50 MG tablet Take 1 tablet by mouth Daily. 9/14/21 5/25/22  Wallace Zavala MD   valACYclovir (Valtrex) 1000 MG tablet Take 1 tablet by mouth 3 (Three) Times a Day. 12/20/21 5/25/22  Henrry Villalta MD     ________________________________________    Current contraception: IUD  History of abnormal Pap smear: no  Family history of uterine or ovarian cancer: no  Family History of colon cancer/colon polyps: no  History of abnormal mammogram: no  History of abnormal lipids: no    The following portions of the patient's history were reviewed and updated as appropriate: allergies, current medications, past family history, past medical history, past social history, past surgical history and problem list.    Review of Systems    Pertinent items are noted in HPI.       Objective   Physical Exam    /82   Ht 167.6 cm (66\")   Wt 88.2 kg (194 lb 6.4 oz)   BMI 31.38 kg/m²    BP Readings from Last 3 " "Encounters:   05/25/22 128/82   12/27/21 128/70   09/14/21 124/79      Wt Readings from Last 3 Encounters:   05/25/22 88.2 kg (194 lb 6.4 oz)   12/27/21 89.5 kg (197 lb 6.4 oz)   08/13/21 86.2 kg (190 lb)        BMI: Estimated body mass index is 31.38 kg/m² as calculated from the following:    Height as of this encounter: 167.6 cm (66\").    Weight as of this encounter: 88.2 kg (194 lb 6.4 oz).       General: alert, appears stated age and cooperative   Heart: regular rate and rhythm, S1, S2 normal, no murmur, click, rub or gallop   Lungs: clear to auscultation bilaterally   Abdomen: soft, non-tender, without masses, no organomegaly   Breast: inspection negative, no nipple discharge or bleeding, no masses or nodularity palpable   External genitalia/Vulva: External genitalia including bartholin's glands, Urethra, La Sal's gland and urethra meatus are normal, Perineum, rectum and anus appear normal  and Bladder appears normal without significant prolapse    Vagina: normal mucosa, normal discharge   Cervix: no lesions and IUD strings are not seen   Uterus: normal size, mobile and non-tender   Adnexa: normal adnexa     As part of wellness and prevention, the following topics were discussed with the patient:  Encouraged self breast exam  Physical activity and regular exercised encouraged.       Assessment:  Diagnoses and all orders for this visit:    1. Well female exam with routine gynecological exam (Primary)  -     IgP, Aptima HPV    2. Vaginal discharge  -     NuSwab VG+ - Swab, Vagina    3. Screening for STD (sexually transmitted disease)  -     RPR  -     Hepatitis B Surface Antigen  -     Hepatitis C Antibody  -     HIV-1 / O / 2 Ag / Antibody 4th Generation    4. Screening for human papillomavirus (HPV)  -     IgP, Aptima HPV    5. Screening for malignant neoplasm of cervix  -     IgP, Aptima HPV        Plan:  Return in about 1 year (around 5/25/2023) for Annual GYN exam.    Henrry Villalta MD  5/25/2022 15:34 EDT  "

## 2022-05-26 LAB
HBV SURFACE AG SERPL QL IA: NEGATIVE
HCV AB S/CO SERPL IA: <0.1 S/CO RATIO (ref 0–0.9)
HIV 1+2 AB+HIV1 P24 AG SERPL QL IA: NON REACTIVE
RPR SER QL: NON REACTIVE

## 2022-05-27 LAB
A VAGINAE DNA VAG QL NAA+PROBE: NORMAL SCORE
BVAB2 DNA VAG QL NAA+PROBE: NORMAL SCORE
C ALBICANS DNA VAG QL NAA+PROBE: NEGATIVE
C GLABRATA DNA VAG QL NAA+PROBE: NEGATIVE
C TRACH DNA VAG QL NAA+PROBE: NEGATIVE
MEGA1 DNA VAG QL NAA+PROBE: NORMAL SCORE
N GONORRHOEA DNA VAG QL NAA+PROBE: NEGATIVE
T VAGINALIS DNA VAG QL NAA+PROBE: NEGATIVE

## 2022-06-01 ENCOUNTER — CLINICAL SUPPORT (OUTPATIENT)
Dept: OBSTETRICS AND GYNECOLOGY | Age: 37
End: 2022-06-01

## 2022-06-01 DIAGNOSIS — Z23 NEED FOR HPV VACCINE: Primary | ICD-10-CM

## 2022-06-01 LAB
CYTOLOGIST CVX/VAG CYTO: NORMAL
CYTOLOGY CVX/VAG DOC CYTO: NORMAL
CYTOLOGY CVX/VAG DOC THIN PREP: NORMAL
DX ICD CODE: NORMAL
HIV 1 & 2 AB SER-IMP: NORMAL
HPV I/H RISK 4 DNA CVX QL PROBE+SIG AMP: NEGATIVE
Lab: NORMAL
OTHER STN SPEC: NORMAL
STAT OF ADQ CVX/VAG CYTO-IMP: NORMAL

## 2022-06-01 PROCEDURE — 90471 IMMUNIZATION ADMIN: CPT | Performed by: OBSTETRICS & GYNECOLOGY

## 2022-06-01 PROCEDURE — 90651 9VHPV VACCINE 2/3 DOSE IM: CPT | Performed by: OBSTETRICS & GYNECOLOGY

## 2022-08-01 ENCOUNTER — CLINICAL SUPPORT (OUTPATIENT)
Dept: OBSTETRICS AND GYNECOLOGY | Age: 37
End: 2022-08-01

## 2022-08-01 DIAGNOSIS — Z23 NEED FOR HPV VACCINE: Primary | ICD-10-CM

## 2022-08-01 PROCEDURE — 90471 IMMUNIZATION ADMIN: CPT | Performed by: NURSE PRACTITIONER

## 2022-08-01 PROCEDURE — 90651 9VHPV VACCINE 2/3 DOSE IM: CPT | Performed by: NURSE PRACTITIONER

## 2022-10-05 ENCOUNTER — CLINICAL SUPPORT (OUTPATIENT)
Dept: OBSTETRICS AND GYNECOLOGY | Age: 37
End: 2022-10-05

## 2022-10-05 PROCEDURE — 90686 IIV4 VACC NO PRSV 0.5 ML IM: CPT | Performed by: OBSTETRICS & GYNECOLOGY

## 2022-10-05 PROCEDURE — 90471 IMMUNIZATION ADMIN: CPT | Performed by: OBSTETRICS & GYNECOLOGY

## 2022-10-07 ENCOUNTER — OFFICE VISIT (OUTPATIENT)
Dept: OBSTETRICS AND GYNECOLOGY | Age: 37
End: 2022-10-07

## 2022-10-07 VITALS — BODY MASS INDEX: 31.38 KG/M2 | DIASTOLIC BLOOD PRESSURE: 84 MMHG | HEIGHT: 66 IN | SYSTOLIC BLOOD PRESSURE: 116 MMHG

## 2022-10-07 DIAGNOSIS — B37.31 YEAST VAGINITIS: Primary | ICD-10-CM

## 2022-10-07 PROCEDURE — 99213 OFFICE O/P EST LOW 20 MIN: CPT | Performed by: NURSE PRACTITIONER

## 2022-10-07 RX ORDER — NYSTATIN AND TRIAMCINOLONE ACETONIDE 100000; 1 [USP'U]/G; MG/G
1 OINTMENT TOPICAL 2 TIMES DAILY
Qty: 30 G | Refills: 0 | Status: SHIPPED | OUTPATIENT
Start: 2022-10-07 | End: 2022-10-12

## 2022-10-07 RX ORDER — FLUCONAZOLE 150 MG/1
150 TABLET ORAL ONCE
Qty: 1 TABLET | Refills: 0 | Status: SHIPPED | OUTPATIENT
Start: 2022-10-07 | End: 2022-10-07

## 2022-10-07 NOTE — PROGRESS NOTES
Subjective   Bren Lynch is a 37 y.o. female.     History of Present Illness     Bren presents with complaint of intermittent mild vaginal itching x several months. She also feels something new and is wondering if something has dropped or if she has a prolapse of some kind.    No associated discharge or itching. nuswab negative 5/2022. Does not desire repeat nuswab.     The following portions of the patient's history were reviewed and updated as appropriate: allergies, current medications, past family history, past medical history, past social history, past surgical history and problem list.    Review of Systems   Constitutional: Negative for chills, fatigue and fever.   Gastrointestinal: Negative for abdominal distention and abdominal pain.   Genitourinary: Negative for decreased urine volume, difficulty urinating, dyspareunia, dysuria, frequency, genital sores, hematuria, menstrual problem, pelvic pain, pelvic pressure, urgency, urinary incontinence, vaginal bleeding, vaginal discharge and vaginal pain.       Objective   Physical Exam  Constitutional:       Appearance: Normal appearance. She is not ill-appearing.   Genitourinary:     Labia:         Right: No rash, tenderness, lesion or injury.         Left: No rash, tenderness, lesion or injury.       Vagina: Normal.      Cervix: Normal.   Neurological:      General: No focal deficit present.      Mental Status: She is alert and oriented to person, place, and time.   Psychiatric:         Mood and Affect: Mood normal.         Behavior: Behavior normal.           Assessment & Plan   Diagnoses and all orders for this visit:    1. Yeast vaginitis (Primary)    Other orders  -     fluconazole (Diflucan) 150 MG tablet; Take 1 tablet by mouth 1 (One) Time for 1 dose.  Dispense: 1 tablet; Refill: 0  -     nystatin-triamcinolone (MYCOLOG) 385511-6.1 UNIT/GM-% ointment; Apply 1 application topically to the appropriate area as directed 2 (Two) Times a Day for 5 days.   Dispense: 30 g; Refill: 0      Normal exam today. No evidence of prolapse. Normal benign appearing mucosa. Patient reassured. Yeast treatment sent to pharmacy.    KESHIA Gannon

## 2022-12-01 ENCOUNTER — CLINICAL SUPPORT (OUTPATIENT)
Dept: OBSTETRICS AND GYNECOLOGY | Age: 37
End: 2022-12-01

## 2022-12-01 DIAGNOSIS — Z23 NEED FOR VACCINATION: Primary | ICD-10-CM

## 2022-12-01 PROCEDURE — 90471 IMMUNIZATION ADMIN: CPT | Performed by: NURSE PRACTITIONER

## 2022-12-01 PROCEDURE — 90651 9VHPV VACCINE 2/3 DOSE IM: CPT | Performed by: NURSE PRACTITIONER

## 2022-12-28 RX ORDER — AMOXICILLIN AND CLAVULANATE POTASSIUM 875; 125 MG/1; MG/1
1 TABLET, FILM COATED ORAL EVERY 12 HOURS
Qty: 14 TABLET | Refills: 0 | Status: SHIPPED | OUTPATIENT
Start: 2022-12-28 | End: 2023-01-04

## 2023-02-17 DIAGNOSIS — F43.22 ADJUSTMENT DISORDER WITH ANXIETY: ICD-10-CM

## 2023-02-17 RX ORDER — ESCITALOPRAM OXALATE 10 MG/1
TABLET ORAL
Qty: 30 TABLET | Refills: 0 | Status: SHIPPED | OUTPATIENT
Start: 2023-02-17 | End: 2023-02-23 | Stop reason: SDUPTHER

## 2023-02-23 ENCOUNTER — OFFICE VISIT (OUTPATIENT)
Dept: FAMILY MEDICINE CLINIC | Facility: CLINIC | Age: 38
End: 2023-02-23
Payer: COMMERCIAL

## 2023-02-23 VITALS
OXYGEN SATURATION: 98 % | SYSTOLIC BLOOD PRESSURE: 114 MMHG | DIASTOLIC BLOOD PRESSURE: 74 MMHG | HEIGHT: 66 IN | BODY MASS INDEX: 31.57 KG/M2 | TEMPERATURE: 98.4 F | WEIGHT: 196.4 LBS | HEART RATE: 66 BPM

## 2023-02-23 DIAGNOSIS — Z13.220 SCREENING FOR LIPID DISORDERS: ICD-10-CM

## 2023-02-23 DIAGNOSIS — F43.22 ADJUSTMENT DISORDER WITH ANXIETY: ICD-10-CM

## 2023-02-23 DIAGNOSIS — Z00.01 ENCOUNTER FOR ANNUAL GENERAL MEDICAL EXAMINATION WITH ABNORMAL FINDINGS IN ADULT: Primary | ICD-10-CM

## 2023-02-23 DIAGNOSIS — E66.09 CLASS 1 OBESITY DUE TO EXCESS CALORIES WITHOUT SERIOUS COMORBIDITY WITH BODY MASS INDEX (BMI) OF 31.0 TO 31.9 IN ADULT: ICD-10-CM

## 2023-02-23 DIAGNOSIS — Z13.1 SCREENING FOR DIABETES MELLITUS (DM): ICD-10-CM

## 2023-02-23 LAB
ALBUMIN SERPL-MCNC: 4.2 G/DL (ref 3.5–5.2)
ALBUMIN/GLOB SERPL: 1.6 G/DL
ALP SERPL-CCNC: 64 U/L (ref 39–117)
ALT SERPL-CCNC: 50 U/L (ref 1–33)
AST SERPL-CCNC: 36 U/L (ref 1–32)
BASOPHILS # BLD AUTO: 0.04 10*3/MM3 (ref 0–0.2)
BASOPHILS NFR BLD AUTO: 0.7 % (ref 0–1.5)
BILIRUB SERPL-MCNC: 0.5 MG/DL (ref 0–1.2)
BUN SERPL-MCNC: 9 MG/DL (ref 6–20)
BUN/CREAT SERPL: 10.3 (ref 7–25)
CALCIUM SERPL-MCNC: 8.9 MG/DL (ref 8.6–10.5)
CHLORIDE SERPL-SCNC: 105 MMOL/L (ref 98–107)
CHOLEST SERPL-MCNC: 213 MG/DL (ref 0–200)
CO2 SERPL-SCNC: 26.8 MMOL/L (ref 22–29)
CREAT SERPL-MCNC: 0.87 MG/DL (ref 0.57–1)
EGFRCR SERPLBLD CKD-EPI 2021: 87 ML/MIN/1.73
EOSINOPHIL # BLD AUTO: 0.07 10*3/MM3 (ref 0–0.4)
EOSINOPHIL NFR BLD AUTO: 1.2 % (ref 0.3–6.2)
ERYTHROCYTE [DISTWIDTH] IN BLOOD BY AUTOMATED COUNT: 11.5 % (ref 12.3–15.4)
GLOBULIN SER CALC-MCNC: 2.7 GM/DL
GLUCOSE SERPL-MCNC: 89 MG/DL (ref 65–99)
HBA1C MFR BLD: 5 % (ref 4.8–5.6)
HCT VFR BLD AUTO: 41.4 % (ref 34–46.6)
HDLC SERPL-MCNC: 45 MG/DL (ref 40–60)
HGB BLD-MCNC: 14.1 G/DL (ref 12–15.9)
IMM GRANULOCYTES # BLD AUTO: 0.02 10*3/MM3 (ref 0–0.05)
IMM GRANULOCYTES NFR BLD AUTO: 0.3 % (ref 0–0.5)
LDLC SERPL CALC-MCNC: 149 MG/DL (ref 0–100)
LYMPHOCYTES # BLD AUTO: 2.18 10*3/MM3 (ref 0.7–3.1)
LYMPHOCYTES NFR BLD AUTO: 35.9 % (ref 19.6–45.3)
MCH RBC QN AUTO: 30.4 PG (ref 26.6–33)
MCHC RBC AUTO-ENTMCNC: 34.1 G/DL (ref 31.5–35.7)
MCV RBC AUTO: 89.2 FL (ref 79–97)
MONOCYTES # BLD AUTO: 0.53 10*3/MM3 (ref 0.1–0.9)
MONOCYTES NFR BLD AUTO: 8.7 % (ref 5–12)
NEUTROPHILS # BLD AUTO: 3.23 10*3/MM3 (ref 1.7–7)
NEUTROPHILS NFR BLD AUTO: 53.2 % (ref 42.7–76)
NRBC BLD AUTO-RTO: 0 /100 WBC (ref 0–0.2)
PLATELET # BLD AUTO: 226 10*3/MM3 (ref 140–450)
POTASSIUM SERPL-SCNC: 4.7 MMOL/L (ref 3.5–5.2)
PROT SERPL-MCNC: 6.9 G/DL (ref 6–8.5)
RBC # BLD AUTO: 4.64 10*6/MM3 (ref 3.77–5.28)
SODIUM SERPL-SCNC: 140 MMOL/L (ref 136–145)
TRIGL SERPL-MCNC: 108 MG/DL (ref 0–150)
TSH SERPL DL<=0.005 MIU/L-ACNC: 1.73 UIU/ML (ref 0.27–4.2)
VLDLC SERPL CALC-MCNC: 19 MG/DL (ref 5–40)
WBC # BLD AUTO: 6.07 10*3/MM3 (ref 3.4–10.8)

## 2023-02-23 PROCEDURE — 99395 PREV VISIT EST AGE 18-39: CPT | Performed by: FAMILY MEDICINE

## 2023-02-23 RX ORDER — MULTIPLE VITAMINS W/ MINERALS TAB 9MG-400MCG
1 TAB ORAL DAILY
COMMUNITY

## 2023-02-23 RX ORDER — ESCITALOPRAM OXALATE 10 MG/1
10 TABLET ORAL DAILY
Qty: 90 TABLET | Refills: 3 | Status: SHIPPED | OUTPATIENT
Start: 2023-02-23

## 2023-02-23 NOTE — PATIENT INSTRUCTIONS
Calorie Counting for Weight Loss  Calories are units of energy. Your body needs a certain number of calories from food to keep going throughout the day. When you eat or drink more calories than your body needs, your body stores the extra calories mostly as fat. When you eat or drink fewer calories than your body needs, your body burns fat to get the energy it needs.  Calorie counting means keeping track of how many calories you eat and drink each day. Calorie counting can be helpful if you need to lose weight. If you eat fewer calories than your body needs, you should lose weight. Ask your health care provider what a healthy weight is for you.  For calorie counting to work, you will need to eat the right number of calories each day to lose a healthy amount of weight per week. A dietitian can help you figure out how many calories you need in a day and will suggest ways to reach your calorie goal.  A healthy amount of weight to lose each week is usually 1-2 lb (0.5-0.9 kg). This usually means that your daily calorie intake should be reduced by 500-750 calories.  Eating 1,200-1,500 calories a day can help most women lose weight.  Eating 1,500-1,800 calories a day can help most men lose weight.  What do I need to know about calorie counting?  Work with your health care provider or dietitian to determine how many calories you should get each day. To meet your daily calorie goal, you will need to:  Find out how many calories are in each food that you would like to eat. Try to do this before you eat.  Decide how much of the food you plan to eat.  Keep a food log. Do this by writing down what you ate and how many calories it had.  To successfully lose weight, it is important to balance calorie counting with a healthy lifestyle that includes regular activity.  Where do I find calorie information?  The number of calories in a food can be found on a Nutrition Facts label. If a food does not have a Nutrition Facts label, try to  look up the calories online or ask your dietitian for help.  Remember that calories are listed per serving. If you choose to have more than one serving of a food, you will have to multiply the calories per serving by the number of servings you plan to eat. For example, the label on a package of bread might say that a serving size is 1 slice and that there are 90 calories in a serving. If you eat 1 slice, you will have eaten 90 calories. If you eat 2 slices, you will have eaten 180 calories.  How do I keep a food log?  After each time that you eat, record the following in your food log as soon as possible:  What you ate. Be sure to include toppings, sauces, and other extras on the food.  How much you ate. This can be measured in cups, ounces, or number of items.  How many calories were in each food and drink.  The total number of calories in the food you ate.  Keep your food log near you, such as in a pocket-sized notebook or on an gadiel or website on your mobile phone. Some programs will calculate calories for you and show you how many calories you have left to meet your daily goal.  What are some portion-control tips?  Know how many calories are in a serving. This will help you know how many servings you can have of a certain food.  Use a measuring cup to measure serving sizes. You could also try weighing out portions on a kitchen scale. With time, you will be able to estimate serving sizes for some foods.  Take time to put servings of different foods on your favorite plates or in your favorite bowls and cups so you know what a serving looks like.  Try not to eat straight from a food's packaging, such as from a bag or box. Eating straight from the package makes it hard to see how much you are eating and can lead to overeating. Put the amount you would like to eat in a cup or on a plate to make sure you are eating the right portion.  Use smaller plates, glasses, and bowls for smaller portions and to prevent  overeating.  Try not to multitask. For example, avoid watching TV or using your computer while eating. If it is time to eat, sit down at a table and enjoy your food. This will help you recognize when you are full. It will also help you be more mindful of what and how much you are eating.  What are tips for following this plan?  Reading food labels  Check the calorie count compared with the serving size. The serving size may be smaller than what you are used to eating.  Check the source of the calories. Try to choose foods that are high in protein, fiber, and vitamins, and low in saturated fat, trans fat, and sodium.  Shopping  Read nutrition labels while you shop. This will help you make healthy decisions about which foods to buy.  Pay attention to nutrition labels for low-fat or fat-free foods. These foods sometimes have the same number of calories or more calories than the full-fat versions. They also often have added sugar, starch, or salt to make up for flavor that was removed with the fat.  Make a grocery list of lower-calorie foods and stick to it.  Cooking  Try to cook your favorite foods in a healthier way. For example, try baking instead of frying.  Use low-fat dairy products.  Meal planning  Use more fruits and vegetables. One-half of your plate should be fruits and vegetables.  Include lean proteins, such as chicken, turkey, and fish.  Lifestyle  Each week, aim to do one of the followin minutes of moderate exercise, such as walking.  75 minutes of vigorous exercise, such as running.  General information  Know how many calories are in the foods you eat most often. This will help you calculate calorie counts faster.  Find a way of tracking calories that works for you. Get creative. Try different apps or programs if writing down calories does not work for you.  What foods should I eat?    Eat nutritious foods. It is better to have a nutritious, high-calorie food, such as an avocado, than a food with  few nutrients, such as a bag of potato chips.  Use your calories on foods and drinks that will fill you up and will not leave you hungry soon after eating.  Examples of foods that fill you up are nuts and nut butters, vegetables, lean proteins, and high-fiber foods such as whole grains. High-fiber foods are foods with more than 5 g of fiber per serving.  Pay attention to calories in drinks. Low-calorie drinks include water and unsweetened drinks.  The items listed above may not be a complete list of foods and beverages you can eat. Contact a dietitian for more information.  What foods should I limit?  Limit foods or drinks that are not good sources of vitamins, minerals, or protein or that are high in unhealthy fats. These include:  Candy.  Other sweets.  Sodas, specialty coffee drinks, alcohol, and juice.  The items listed above may not be a complete list of foods and beverages you should avoid. Contact a dietitian for more information.  How do I count calories when eating out?  Pay attention to portions. Often, portions are much larger when eating out. Try these tips to keep portions smaller:  Consider sharing a meal instead of getting your own.  If you get your own meal, eat only half of it. Before you start eating, ask for a container and put half of your meal into it.  When available, consider ordering smaller portions from the menu instead of full portions.  Pay attention to your food and drink choices. Knowing the way food is cooked and what is included with the meal can help you eat fewer calories.  If calories are listed on the menu, choose the lower-calorie options.  Choose dishes that include vegetables, fruits, whole grains, low-fat dairy products, and lean proteins.  Choose items that are boiled, broiled, grilled, or steamed. Avoid items that are buttered, battered, fried, or served with cream sauce. Items labeled as crispy are usually fried, unless stated otherwise.  Choose water, low-fat milk,  unsweetened iced tea, or other drinks without added sugar. If you want an alcoholic beverage, choose a lower-calorie option, such as a glass of wine or light beer.  Ask for dressings, sauces, and syrups on the side. These are usually high in calories, so you should limit the amount you eat.  If you want a salad, choose a garden salad and ask for grilled meats. Avoid extra toppings such as morales, cheese, or fried items. Ask for the dressing on the side, or ask for olive oil and vinegar or lemon to use as dressing.  Estimate how many servings of a food you are given. Knowing serving sizes will help you be aware of how much food you are eating at restaurants.  Where to find more information  Centers for Disease Control and Prevention: www.cdc.gov  [place] Department of Agriculture: myplate.gov  Summary  Calorie counting means keeping track of how many calories you eat and drink each day. If you eat fewer calories than your body needs, you should lose weight.  A healthy amount of weight to lose per week is usually 1-2 lb (0.5-0.9 kg). This usually means reducing your daily calorie intake by 500-750 calories.  The number of calories in a food can be found on a Nutrition Facts label. If a food does not have a Nutrition Facts label, try to look up the calories online or ask your dietitian for help.  Use smaller plates, glasses, and bowls for smaller portions and to prevent overeating.  Use your calories on foods and drinks that will fill you up and not leave you hungry shortly after a meal.  This information is not intended to replace advice given to you by your health care provider. Make sure you discuss any questions you have with your health care provider.  Document Revised: 01/28/2021 Document Reviewed: 01/28/2021  [employer] Patient Education © 2022 [employer]

## 2023-02-23 NOTE — PROGRESS NOTES
Subjective   Bren Lynch is a 38 y.o. female who presents for annual female wellness exam.  Chief Complaint   Patient presents with   • Annual Exam     No paps   • Anxiety     Has started pilates.  Working upstairs.  Is getting regular through her divorce.  She denies any specific complaints today.  She feels her Lexapro really helps her.    Menstrual History: IUD  Pregnancy History: G2  Sexual History:    Contraception: IUD  Hormone Replacement Therapy: na  Diet: standard  Exercise: pilates.  Up to date with eye care, due for dental.     Mammogram: na  Pap Smear: 2022  Bone Density: na  Colon Cancer Screening: na    Immunization History   Administered Date(s) Administered   • COVID-19 (MODERNA) 1st, 2nd, 3rd Dose Only 2021, 2022   • Flu Vaccine Intradermal Quad 18-64YR 10/16/2019   • FluLaval/Fluzone >6mos 10/05/2022   • Flublok 18+yrs 10/28/2019   • Hpv9 2022, 2022, 2022   • Influenza, Unspecified 10/05/2022   • Tdap 2020       The following portions of the patient's history were reviewed and updated as appropriate: allergies, current medications, past family history, past medical history, past social history, past surgical history and problem list.    History reviewed. No pertinent past medical history.    Past Surgical History:   Procedure Laterality Date   •  SECTION     •  SECTION     • TONSILLECTOMY  1986       Family History   Problem Relation Age of Onset   • Breast cancer Mother 58   • Hypertension Mother    • Hyperlipidemia Mother    • Other Mother         BLOOD CLOTS   • Liver cancer Mother 65   • Bone cancer Mother 65   • Stroke Maternal Grandfather    • Lung cancer Maternal Grandfather    • Emphysema Maternal Grandfather    • Uterine cancer Paternal Grandmother 60   • Stroke Paternal Grandfather    • Prostate cancer Paternal Grandfather    • Hypertension Father    • Hyperlipidemia Father    • Osteoporosis Maternal Grandmother    • Cleft  "palate Cousin    • Mental retardation Cousin    • Von Willebrand disease Cousin    • Lung cancer Maternal Uncle 56   • Brain cancer Maternal Uncle 56   • Bone cancer Maternal Uncle 56   • Ovarian cancer Neg Hx    • Colon cancer Neg Hx    • Melanoma Neg Hx        Social History     Socioeconomic History   • Marital status:      Spouse name: MARIA D   • Number of children: 2   • Years of education: 12   Tobacco Use   • Smoking status: Never   • Smokeless tobacco: Never   Vaping Use   • Vaping Use: Never used   Substance and Sexual Activity   • Alcohol use: No   • Drug use: No   • Sexual activity: Yes     Partners: Male     Birth control/protection: I.U.D.     Comment: Mirena 2/2017         Current Outpatient Medications:   •  cetirizine (zyrTEC) 10 MG tablet, Take 10 mg by mouth Daily., Disp: , Rfl:   •  escitalopram (LEXAPRO) 10 MG tablet, Take 1 tablet by mouth Daily., Disp: 90 tablet, Rfl: 3  •  levonorgestrel (MIRENA) 20 MCG/24HR IUD, 1 each by Intrauterine route 1 (One) Time., Disp: , Rfl:   •  multivitamin with minerals tablet tablet, Take 1 tablet by mouth Daily., Disp: , Rfl:     Review of Systems    Objective   Vitals:    02/23/23 1022   BP: 114/74   Pulse: 66   Temp: 98.4 °F (36.9 °C)   SpO2: 98%   Weight: 89.1 kg (196 lb 6.4 oz)   Height: 167.6 cm (66\")     Body mass index is 31.7 kg/m².  Physical Exam  Vitals and nursing note reviewed.   Constitutional:       General: She is not in acute distress.     Appearance: Normal appearance. She is well-developed.   HENT:      Head: Normocephalic and atraumatic.      Right Ear: Tympanic membrane, ear canal and external ear normal.      Left Ear: Tympanic membrane, ear canal and external ear normal.      Nose: Nose normal.      Mouth/Throat:      Mouth: Mucous membranes are moist.      Pharynx: Oropharynx is clear. No oropharyngeal exudate or posterior oropharyngeal erythema.   Eyes:      Conjunctiva/sclera: Conjunctivae normal.      Pupils: Pupils are equal, " round, and reactive to light.   Neck:      Thyroid: No thyromegaly.   Cardiovascular:      Rate and Rhythm: Normal rate and regular rhythm.      Heart sounds: No murmur heard.  Pulmonary:      Effort: Pulmonary effort is normal.      Breath sounds: Normal breath sounds. No wheezing.   Abdominal:      General: Abdomen is flat. Bowel sounds are normal. There is no distension.      Palpations: Abdomen is soft. There is no mass.      Tenderness: There is no abdominal tenderness.      Hernia: No hernia is present.   Musculoskeletal:         General: No swelling. Normal range of motion.      Cervical back: Normal range of motion and neck supple.      Right lower leg: No edema.      Left lower leg: No edema.   Lymphadenopathy:      Cervical: No cervical adenopathy.   Skin:     General: Skin is warm and dry.      Capillary Refill: Capillary refill takes less than 2 seconds.      Findings: No rash.   Neurological:      General: No focal deficit present.      Mental Status: She is alert and oriented to person, place, and time.      Cranial Nerves: No cranial nerve deficit.   Psychiatric:         Mood and Affect: Mood normal.         Behavior: Behavior normal.           Assessment & Plan   Diagnoses and all orders for this visit:    1. Encounter for annual general medical examination with abnormal findings in adult (Primary)  -     Lipid Panel  -     CBC & Differential  -     Comprehensive Metabolic Panel  -     TSH  -     Hemoglobin A1c    2. Adjustment disorder with anxiety  -     escitalopram (LEXAPRO) 10 MG tablet; Take 1 tablet by mouth Daily.  Dispense: 90 tablet; Refill: 3    3. Class 1 obesity due to excess calories without serious comorbidity with body mass index (BMI) of 31.0 to 31.9 in adult  -     Lipid Panel  -     CBC & Differential  -     Comprehensive Metabolic Panel  -     TSH  -     Hemoglobin A1c    4. Screening for diabetes mellitus (DM)  -     Hemoglobin A1c    5. Screening for lipid disorders  -     Lipid  Panel               Discussed the importance of maintaining a healthy weight and getting regular exercise.  Educated patient on the benefits of healthy diet.  Advise follow-up annually for wellness exams.    Patient Instructions   Calorie Counting for Weight Loss  Calories are units of energy. Your body needs a certain number of calories from food to keep going throughout the day. When you eat or drink more calories than your body needs, your body stores the extra calories mostly as fat. When you eat or drink fewer calories than your body needs, your body burns fat to get the energy it needs.  Calorie counting means keeping track of how many calories you eat and drink each day. Calorie counting can be helpful if you need to lose weight. If you eat fewer calories than your body needs, you should lose weight. Ask your health care provider what a healthy weight is for you.  For calorie counting to work, you will need to eat the right number of calories each day to lose a healthy amount of weight per week. A dietitian can help you figure out how many calories you need in a day and will suggest ways to reach your calorie goal.  • A healthy amount of weight to lose each week is usually 1-2 lb (0.5-0.9 kg). This usually means that your daily calorie intake should be reduced by 500-750 calories.  • Eating 1,200-1,500 calories a day can help most women lose weight.  • Eating 1,500-1,800 calories a day can help most men lose weight.  What do I need to know about calorie counting?  Work with your health care provider or dietitian to determine how many calories you should get each day. To meet your daily calorie goal, you will need to:  • Find out how many calories are in each food that you would like to eat. Try to do this before you eat.  • Decide how much of the food you plan to eat.  • Keep a food log. Do this by writing down what you ate and how many calories it had.  To successfully lose weight, it is important to balance  calorie counting with a healthy lifestyle that includes regular activity.  Where do I find calorie information?  The number of calories in a food can be found on a Nutrition Facts label. If a food does not have a Nutrition Facts label, try to look up the calories online or ask your dietitian for help.  Remember that calories are listed per serving. If you choose to have more than one serving of a food, you will have to multiply the calories per serving by the number of servings you plan to eat. For example, the label on a package of bread might say that a serving size is 1 slice and that there are 90 calories in a serving. If you eat 1 slice, you will have eaten 90 calories. If you eat 2 slices, you will have eaten 180 calories.  How do I keep a food log?  After each time that you eat, record the following in your food log as soon as possible:  • What you ate. Be sure to include toppings, sauces, and other extras on the food.  • How much you ate. This can be measured in cups, ounces, or number of items.  • How many calories were in each food and drink.  • The total number of calories in the food you ate.  Keep your food log near you, such as in a pocket-sized notebook or on an gadiel or website on your mobile phone. Some programs will calculate calories for you and show you how many calories you have left to meet your daily goal.  What are some portion-control tips?  • Know how many calories are in a serving. This will help you know how many servings you can have of a certain food.  • Use a measuring cup to measure serving sizes. You could also try weighing out portions on a kitchen scale. With time, you will be able to estimate serving sizes for some foods.  • Take time to put servings of different foods on your favorite plates or in your favorite bowls and cups so you know what a serving looks like.  • Try not to eat straight from a food's packaging, such as from a bag or box. Eating straight from the package makes  it hard to see how much you are eating and can lead to overeating. Put the amount you would like to eat in a cup or on a plate to make sure you are eating the right portion.  • Use smaller plates, glasses, and bowls for smaller portions and to prevent overeating.  • Try not to multitask. For example, avoid watching TV or using your computer while eating. If it is time to eat, sit down at a table and enjoy your food. This will help you recognize when you are full. It will also help you be more mindful of what and how much you are eating.  What are tips for following this plan?  Reading food labels  • Check the calorie count compared with the serving size. The serving size may be smaller than what you are used to eating.  • Check the source of the calories. Try to choose foods that are high in protein, fiber, and vitamins, and low in saturated fat, trans fat, and sodium.  Shopping  • Read nutrition labels while you shop. This will help you make healthy decisions about which foods to buy.  • Pay attention to nutrition labels for low-fat or fat-free foods. These foods sometimes have the same number of calories or more calories than the full-fat versions. They also often have added sugar, starch, or salt to make up for flavor that was removed with the fat.  • Make a grocery list of lower-calorie foods and stick to it.  Cooking  • Try to cook your favorite foods in a healthier way. For example, try baking instead of frying.  • Use low-fat dairy products.  Meal planning  • Use more fruits and vegetables. One-half of your plate should be fruits and vegetables.  • Include lean proteins, such as chicken, turkey, and fish.  Lifestyle  Each week, aim to do one of the following:  • 150 minutes of moderate exercise, such as walking.  • 75 minutes of vigorous exercise, such as running.  General information  • Know how many calories are in the foods you eat most often. This will help you calculate calorie counts faster.  • Find a  way of tracking calories that works for you. Get creative. Try different apps or programs if writing down calories does not work for you.  What foods should I eat?    1. Eat nutritious foods. It is better to have a nutritious, high-calorie food, such as an avocado, than a food with few nutrients, such as a bag of potato chips.  2. Use your calories on foods and drinks that will fill you up and will not leave you hungry soon after eating.  ? Examples of foods that fill you up are nuts and nut butters, vegetables, lean proteins, and high-fiber foods such as whole grains. High-fiber foods are foods with more than 5 g of fiber per serving.  3. Pay attention to calories in drinks. Low-calorie drinks include water and unsweetened drinks.  The items listed above may not be a complete list of foods and beverages you can eat. Contact a dietitian for more information.  What foods should I limit?  Limit foods or drinks that are not good sources of vitamins, minerals, or protein or that are high in unhealthy fats. These include:  • Candy.  • Other sweets.  • Sodas, specialty coffee drinks, alcohol, and juice.  The items listed above may not be a complete list of foods and beverages you should avoid. Contact a dietitian for more information.  How do I count calories when eating out?  1. Pay attention to portions. Often, portions are much larger when eating out. Try these tips to keep portions smaller:  ? Consider sharing a meal instead of getting your own.  ? If you get your own meal, eat only half of it. Before you start eating, ask for a container and put half of your meal into it.  ? When available, consider ordering smaller portions from the menu instead of full portions.  2. Pay attention to your food and drink choices. Knowing the way food is cooked and what is included with the meal can help you eat fewer calories.  ? If calories are listed on the menu, choose the lower-calorie options.  ? Choose dishes that include  vegetables, fruits, whole grains, low-fat dairy products, and lean proteins.  ? Choose items that are boiled, broiled, grilled, or steamed. Avoid items that are buttered, battered, fried, or served with cream sauce. Items labeled as crispy are usually fried, unless stated otherwise.  ? Choose water, low-fat milk, unsweetened iced tea, or other drinks without added sugar. If you want an alcoholic beverage, choose a lower-calorie option, such as a glass of wine or light beer.  ? Ask for dressings, sauces, and syrups on the side. These are usually high in calories, so you should limit the amount you eat.  ? If you want a salad, choose a garden salad and ask for grilled meats. Avoid extra toppings such as morales, cheese, or fried items. Ask for the dressing on the side, or ask for olive oil and vinegar or lemon to use as dressing.  3. Estimate how many servings of a food you are given. Knowing serving sizes will help you be aware of how much food you are eating at restaurants.  Where to find more information  • Centers for Disease Control and Prevention: www.cdc.gov  • [place] Department of Agriculture: myplate.gov  Summary  • Calorie counting means keeping track of how many calories you eat and drink each day. If you eat fewer calories than your body needs, you should lose weight.  • A healthy amount of weight to lose per week is usually 1-2 lb (0.5-0.9 kg). This usually means reducing your daily calorie intake by 500-750 calories.  • The number of calories in a food can be found on a Nutrition Facts label. If a food does not have a Nutrition Facts label, try to look up the calories online or ask your dietitian for help.  • Use smaller plates, glasses, and bowls for smaller portions and to prevent overeating.  • Use your calories on foods and drinks that will fill you up and not leave you hungry shortly after a meal.  This information is not intended to replace advice given to you by your health care provider. Make sure  you discuss any questions you have with your health care provider.  Document Revised: 01/28/2021 Document Reviewed: 01/28/2021  Elsevier Patient Education © 2022 [employer]

## 2023-08-10 ENCOUNTER — OFFICE VISIT (OUTPATIENT)
Dept: FAMILY MEDICINE CLINIC | Facility: CLINIC | Age: 38
End: 2023-08-10
Payer: COMMERCIAL

## 2023-08-10 VITALS
TEMPERATURE: 98.6 F | SYSTOLIC BLOOD PRESSURE: 118 MMHG | OXYGEN SATURATION: 96 % | BODY MASS INDEX: 31.63 KG/M2 | WEIGHT: 196.8 LBS | DIASTOLIC BLOOD PRESSURE: 78 MMHG | HEIGHT: 66 IN | HEART RATE: 105 BPM

## 2023-08-10 DIAGNOSIS — R68.89 EPISODES OF DECREASED ATTENTIVENESS: ICD-10-CM

## 2023-08-10 DIAGNOSIS — F43.22 ADJUSTMENT DISORDER WITH ANXIETY: Primary | ICD-10-CM

## 2023-08-10 PROCEDURE — 99213 OFFICE O/P EST LOW 20 MIN: CPT | Performed by: NURSE PRACTITIONER

## 2023-08-10 RX ORDER — BUPROPION HYDROCHLORIDE 150 MG/1
150 TABLET ORAL DAILY
Qty: 30 TABLET | Refills: 2 | Status: SHIPPED | OUTPATIENT
Start: 2023-08-10

## 2023-08-10 NOTE — PROGRESS NOTES
"Chief Complaint  ADD    Subjective        Bren Lynch presents to [facility]  History of Present Illness    Patient is a patient of Dr. Meredith Kehrer.  She presents today with complaint of issues with attention.   She reports when she was young she was diagnosed with ADD.  She was on medication until 5th grade.    For past 3 years she has had increase in trouble focusing and finishing a task.      Adjustment disorder with anxiety: currently on lexapro.  Feels well controlled and like things are doing well.        Objective   Vital Signs:  /78   Pulse 105   Temp 98.6 øF (37 øC)   Ht 167.6 cm (66\")   Wt 89.3 kg (196 lb 12.8 oz)   SpO2 96%   BMI 31.76 kg/mý   Estimated body mass index is 31.76 kg/mý as calculated from the following:    Height as of this encounter: 167.6 cm (66\").    Weight as of this encounter: 89.3 kg (196 lb 12.8 oz).             Physical Exam  Constitutional:       Appearance: Normal appearance.   Cardiovascular:      Rate and Rhythm: Normal rate and regular rhythm.      Pulses: Normal pulses.   Pulmonary:      Effort: Pulmonary effort is normal.      Breath sounds: Normal breath sounds.   Skin:     General: Skin is warm and dry.   Neurological:      Mental Status: She is alert and oriented to person, place, and time.   Psychiatric:         Mood and Affect: Mood normal.         Behavior: Behavior normal.         Thought Content: Thought content normal.         Judgment: Judgment normal.      Result Review :                   Assessment and Plan   Diagnoses and all orders for this visit:    1. Adjustment disorder with anxiety (Primary)    2. Episodes of decreased attentiveness    Other orders  -     buPROPion XL (Wellbutrin XL) 150 MG 24 hr tablet; Take 1 tablet by mouth Daily.  Dispense: 30 tablet; Refill: 2    I discussed with her a referral for evaluation of adult ADHD to psychiatry.  She does not want to proceed with this currently.  I discussed " that Wellbutrin can sometimes be a treatment for attentiveness.  She would like to try this first.  If she has any issues she should let me know.  Any suicidal homicidal ideations go to the ER.  Will follow-up in 4 weeks for medication management.         Follow Up   No follow-ups on file.  Patient was given instructions and counseling regarding her condition or for health maintenance advice. Please see specific information pulled into the AVS if appropriate.

## 2023-09-07 ENCOUNTER — OFFICE VISIT (OUTPATIENT)
Dept: FAMILY MEDICINE CLINIC | Facility: CLINIC | Age: 38
End: 2023-09-07
Payer: COMMERCIAL

## 2023-09-07 VITALS
OXYGEN SATURATION: 97 % | SYSTOLIC BLOOD PRESSURE: 118 MMHG | WEIGHT: 196.4 LBS | HEART RATE: 105 BPM | TEMPERATURE: 98.6 F | BODY MASS INDEX: 31.57 KG/M2 | DIASTOLIC BLOOD PRESSURE: 68 MMHG | HEIGHT: 66 IN

## 2023-09-07 DIAGNOSIS — F43.22 ADJUSTMENT DISORDER WITH ANXIETY: Primary | ICD-10-CM

## 2023-09-07 PROCEDURE — 99213 OFFICE O/P EST LOW 20 MIN: CPT | Performed by: NURSE PRACTITIONER

## 2023-09-07 RX ORDER — BUPROPION HYDROCHLORIDE 300 MG/1
300 TABLET ORAL DAILY
Qty: 30 TABLET | Refills: 2 | Status: SHIPPED | OUTPATIENT
Start: 2023-09-07

## 2023-09-07 NOTE — PROGRESS NOTES
"Chief Complaint  Anxiety and Depression    Subjective        Bren Lynch presents to [facility]  History of Present Illness    Patient is a patient of Dr. Meredith Kehrer.  She presents today for follow-up on anxiety and depression and inattentiveness.  She is currently on Lexapro 10 mg and last visit we started Wellbutrin 150 mg.  She reports she hasn't noticed much change with symptoms since starting.  Denies any side effects to medication.           Objective   Vital Signs:  /68   Pulse 105   Temp 98.6 °F (37 °C)   Ht 167.6 cm (66\")   Wt 89.1 kg (196 lb 6.4 oz)   SpO2 97%   BMI 31.70 kg/m²   Estimated body mass index is 31.7 kg/m² as calculated from the following:    Height as of this encounter: 167.6 cm (66\").    Weight as of this encounter: 89.1 kg (196 lb 6.4 oz).             Physical Exam  Constitutional:       Appearance: Normal appearance.   Cardiovascular:      Rate and Rhythm: Normal rate and regular rhythm.      Pulses: Normal pulses.   Pulmonary:      Effort: Pulmonary effort is normal.      Breath sounds: Normal breath sounds.   Skin:     General: Skin is warm and dry.   Neurological:      Mental Status: She is alert.   Psychiatric:         Mood and Affect: Mood normal.         Behavior: Behavior normal.         Thought Content: Thought content normal.         Judgment: Judgment normal.      Result Review :                   Assessment and Plan   Diagnoses and all orders for this visit:    1. Adjustment disorder with anxiety (Primary)    Other orders  -     buPROPion XL (Wellbutrin XL) 300 MG 24 hr tablet; Take 1 tablet by mouth Daily.  Dispense: 30 tablet; Refill: 2    We discussed in increasing dose to see if those will help with inattentiveness.  I would like her to give it a few weeks.  If no improvement please let me know and I will refer to psych.  If she has improvement but is not where she wants we can also increase dose to 450 mg dosing.  She " verbalizes understanding and is agreeable.         Follow Up   Return if symptoms worsen or fail to improve, for Next scheduled follow up.  Patient was given instructions and counseling regarding her condition or for health maintenance advice. Please see specific information pulled into the AVS if appropriate.

## 2023-12-04 RX ORDER — ONDANSETRON 4 MG/1
4 TABLET, FILM COATED ORAL DAILY PRN
Qty: 30 TABLET | Refills: 1 | Status: SHIPPED | OUTPATIENT
Start: 2023-12-04 | End: 2024-12-03

## 2023-12-18 RX ORDER — BUPROPION HYDROCHLORIDE 300 MG/1
300 TABLET ORAL DAILY
Qty: 30 TABLET | Refills: 2 | Status: SHIPPED | OUTPATIENT
Start: 2023-12-18

## 2024-01-17 ENCOUNTER — OFFICE VISIT (OUTPATIENT)
Dept: OBSTETRICS AND GYNECOLOGY | Age: 39
End: 2024-01-17
Payer: COMMERCIAL

## 2024-01-17 VITALS
HEIGHT: 66 IN | WEIGHT: 190 LBS | SYSTOLIC BLOOD PRESSURE: 120 MMHG | DIASTOLIC BLOOD PRESSURE: 70 MMHG | BODY MASS INDEX: 30.53 KG/M2

## 2024-01-17 DIAGNOSIS — Z12.4 SCREENING FOR MALIGNANT NEOPLASM OF CERVIX: ICD-10-CM

## 2024-01-17 DIAGNOSIS — Z01.419 WELL FEMALE EXAM WITH ROUTINE GYNECOLOGICAL EXAM: Primary | ICD-10-CM

## 2024-01-17 DIAGNOSIS — Z11.51 SCREENING FOR HUMAN PAPILLOMAVIRUS (HPV): ICD-10-CM

## 2024-01-17 DIAGNOSIS — Z13.89 SCREENING FOR BLOOD OR PROTEIN IN URINE: ICD-10-CM

## 2024-01-17 DIAGNOSIS — F90.0 ATTENTION DEFICIT HYPERACTIVITY DISORDER (ADHD), PREDOMINANTLY INATTENTIVE TYPE: ICD-10-CM

## 2024-01-17 DIAGNOSIS — Z12.31 SCREENING MAMMOGRAM, ENCOUNTER FOR: ICD-10-CM

## 2024-01-17 LAB
BILIRUB BLD-MCNC: NEGATIVE MG/DL
CLARITY, POC: CLEAR
COLOR UR: YELLOW
GLUCOSE UR STRIP-MCNC: NEGATIVE MG/DL
KETONES UR QL: NEGATIVE
LEUKOCYTE EST, POC: NEGATIVE
NITRITE UR-MCNC: NEGATIVE MG/ML
PH UR: 5 [PH] (ref 5–8)
PROT UR STRIP-MCNC: NEGATIVE MG/DL
RBC # UR STRIP: ABNORMAL /UL
SP GR UR: 1.03 (ref 1–1.03)
UROBILINOGEN UR QL: NORMAL

## 2024-01-17 NOTE — PROGRESS NOTES
[facility]   Obstetrics and Gynecology     2024    Patient: Bren Lynch          MR#:[id number]    History of Present Illness    Chief Complaint   Patient presents with    Gynecologic Exam     CC: Annual, last pap 22 neg, HPV neg, last mammo 19 neg       38 y.o. female  who presents for annual exam.    The patient presents for her regular exam feeling well without complaints.  She has an IUD in place and has some periodic spotting but no heavy bleeding.  The patient has a past history of attention deficit disorder and feels like those symptoms are worsening.  She is needing a provider to provide treatment.    Relevant data reviewed:    No LMP recorded. Patient has had an implant.  Obstetric History:  OB History          2    Para   2    Term   2            AB        Living   2         SAB        IAB        Ectopic        Molar        Multiple        Live Births   2               Menstrual History:     No LMP recorded. Patient has had an implant.       Social History     Substance and Sexual Activity   Sexual Activity Not Currently    Partners: Male    Birth control/protection: I.U.D.    Comment: Mirena 2017     ______________________________________  Patient Active Problem List   Diagnosis    IUD (intrauterine device) in place    Family history of breast cancer     Past Medical History:   Diagnosis Date    Anxiety     Depression      Past Surgical History:   Procedure Laterality Date     SECTION       SECTION  2013    TONSILLECTOMY  1986     Social History     Tobacco Use   Smoking Status Never    Passive exposure: Never   Smokeless Tobacco Never     Family History   Problem Relation Age of Onset    Hypertension Father     Hyperlipidemia Father     Breast cancer Mother 58    Hypertension Mother     Hyperlipidemia Mother     Other Mother         BLOOD CLOTS    Liver cancer Mother 65    Bone cancer Mother 65    Stroke Paternal Grandfather      Prostate cancer Paternal Grandfather     Uterine cancer Paternal Grandmother 60    Osteoporosis Maternal Grandmother     Stroke Maternal Grandfather     Lung cancer Maternal Grandfather     Emphysema Maternal Grandfather     Lung cancer Maternal Uncle 56    Brain cancer Maternal Uncle 56    Bone cancer Maternal Uncle 56    Cleft palate Cousin     Mental retardation Cousin     Von Willebrand disease Cousin     Ovarian cancer Neg Hx     Colon cancer Neg Hx     Melanoma Neg Hx      Prior to Admission medications    Medication Sig Start Date End Date Taking? Authorizing Provider   buPROPion XL (WELLBUTRIN XL) 300 MG 24 hr tablet TAKE 1 TABLET BY MOUTH EVERY DAY 12/18/23  Yes Kehrer, Meredith Lea, MD   cetirizine (zyrTEC) 10 MG tablet Take 1 tablet by mouth Daily.   Yes ProviderMarie MD   escitalopram (LEXAPRO) 10 MG tablet Take 1 tablet by mouth Daily. 2/23/23  Yes Kehrer, Meredith Lea, MD   Levonorgestrel (Mirena, 52 MG,) 20 MCG/DAY intrauterine device IUD To be inserted one time by prescriber. Route intrauterine. 12/12/23  Yes Henrry Villalta MD   levonorgestrel (MIRENA) 20 MCG/24HR IUD 1 each by Intrauterine route 1 (One) Time.  Patient not taking: Reported on 1/17/2024    ProviderMarie MD   ondansetron (Zofran) 4 MG tablet Take 1 tablet by mouth Daily As Needed for Nausea or Vomiting.  Patient not taking: Reported on 1/17/2024 12/4/23 12/3/24  Rukhsana Peck APRN     _______________________________________    Current contraception: IUD  History of abnormal Pap smear: no  Family history of uterine or ovarian cancer: no  Family History of colon cancer/colon polyps: no  History of abnormal mammogram: no  History of abnormal lipids: no    The following portions of the patient's history were reviewed and updated as appropriate: allergies, current medications, past family history, past medical history, past social history, past surgical history, and problem list.    Review of Systems    Pertinent  "items are noted in HPI.       Objective   Physical Exam    /70   Ht 167.6 cm (66\")   Wt 86.2 kg (190 lb)   BMI 30.67 kg/m²    BP Readings from Last 3 Encounters:   24 120/70   23 118/68   08/10/23 118/78      Wt Readings from Last 3 Encounters:   24 86.2 kg (190 lb)   23 89.1 kg (196 lb 6.4 oz)   08/10/23 89.3 kg (196 lb 12.8 oz)        BMI: Estimated body mass index is 30.67 kg/m² as calculated from the following:    Height as of this encounter: 167.6 cm (66\").    Weight as of this encounter: 86.2 kg (190 lb).       General: alert, appears stated age, and cooperative   Heart: regular rate and rhythm, S1, S2 normal, no murmur, click, rub or gallop   Lungs: clear to auscultation bilaterally   Abdomen: soft, non-tender, without masses, no organomegaly   Breast: inspection negative, no nipple discharge or bleeding, no masses or nodularity palpable   External genitalia/Vulva: External genitalia including bartholin's glands, Urethra, [place]'s gland and urethra meatus are normal, Perineum, rectum and anus appear normal , and Bladder appears normal without significant prolapse    Vagina: normal mucosa, normal discharge   Cervix: no lesions, IUD strings are visible, and strings appear 1 cm   Uterus: normal size and non-tender   Adnexa: normal adnexa     As part of wellness and prevention, the following topics were discussed with the patient:  Encouraged self breast exam  Physical activity and regular exercised encouraged.         Problem List   Meds  History  Prep for Surg   Imagin}    Assessment:  Diagnoses and all orders for this visit:    1. Well female exam with routine gynecological exam (Primary)  -     IGP, Apt HPV,rfx 16 / 18,45    2. Screening for human papillomavirus (HPV)  -     IGP, Apt HPV,rfx 16 / 18,45    3. Screening for malignant neoplasm of cervix  -     IGP, Apt HPV,rfx 16 / 18,45    4. Screening for blood or protein in urine  -     POC Urinalysis Dipstick    5. " Attention deficit hyperactivity disorder (ADHD), predominantly inattentive type  -     Ambulatory Referral to Psychiatry  -     Ambulatory Referral to Behavioral Health    6. Screening mammogram, encounter for  -     Mammo Screening Digital Tomosynthesis Bilateral With CAD; Future      Plan:  Return in 1 year (on 1/17/2025) for Annual exam.    Henrry Villalta MD  1/17/2024 09:31 EST

## 2024-02-15 ENCOUNTER — TELEMEDICINE (OUTPATIENT)
Dept: PSYCHIATRY | Facility: CLINIC | Age: 39
End: 2024-02-15
Payer: COMMERCIAL

## 2024-02-15 DIAGNOSIS — F90.0 ADHD (ATTENTION DEFICIT HYPERACTIVITY DISORDER), INATTENTIVE TYPE: Primary | ICD-10-CM

## 2024-02-15 DIAGNOSIS — Z02.89 OTHER GENERAL MEDICAL EXAMINATION FOR ADMINISTRATIVE PURPOSES: ICD-10-CM

## 2024-02-15 RX ORDER — BUPROPION HYDROCHLORIDE 150 MG/1
150 TABLET ORAL EVERY MORNING
Qty: 90 TABLET | Refills: 2 | Status: SHIPPED | OUTPATIENT
Start: 2024-02-15

## 2024-02-19 ENCOUNTER — TELEPHONE (OUTPATIENT)
Dept: PSYCHIATRY | Facility: CLINIC | Age: 39
End: 2024-02-19
Payer: COMMERCIAL

## 2024-02-19 NOTE — TELEPHONE ENCOUNTER
Pt called and stated that it would be an inconvenient drive for her to get the UDS done. 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 Please advise

## 2024-02-19 NOTE — TELEPHONE ENCOUNTER
Spoke with patient and they stated there was not a Free standing [facility] near them. But the patient stated she works at [facility] [employer] and there is a [facility] in her office if it could be sent to her location at [address] [place] [place] [id number]

## 2024-02-20 DIAGNOSIS — Z02.89 OTHER GENERAL MEDICAL EXAMINATION FOR ADMINISTRATIVE PURPOSES: Primary | ICD-10-CM

## 2024-02-20 DIAGNOSIS — Z02.83 ENCOUNTER FOR DRUG SCREENING: Primary | ICD-10-CM

## 2024-02-20 NOTE — PROGRESS NOTES
Patient wanted a blood test for the drug screen instead of a urine drug screen.  Order was placed.

## 2024-02-20 NOTE — TELEPHONE ENCOUNTER
Pt called back to check to see if there is a way she can get an order for a blood test instead of the Urine drug screen.

## 2024-02-22 ENCOUNTER — TELEPHONE (OUTPATIENT)
Dept: OBSTETRICS AND GYNECOLOGY | Age: 39
End: 2024-02-22
Payer: COMMERCIAL

## 2024-02-22 DIAGNOSIS — J32.9 SINUSITIS, UNSPECIFIED CHRONICITY, UNSPECIFIED LOCATION: Primary | ICD-10-CM

## 2024-02-22 RX ORDER — AZITHROMYCIN 250 MG/1
TABLET, FILM COATED ORAL
Qty: 6 TABLET | Refills: 0 | Status: SHIPPED | OUTPATIENT
Start: 2024-02-22 | End: 2024-02-26

## 2024-02-22 NOTE — TELEPHONE ENCOUNTER
Pt called believes she has a sinus infection and wants to know if you can call her in a Z-pack, please advise. thanks

## 2024-02-26 ENCOUNTER — OFFICE VISIT (OUTPATIENT)
Dept: FAMILY MEDICINE CLINIC | Facility: CLINIC | Age: 39
End: 2024-02-26
Payer: COMMERCIAL

## 2024-02-26 VITALS
DIASTOLIC BLOOD PRESSURE: 76 MMHG | SYSTOLIC BLOOD PRESSURE: 124 MMHG | BODY MASS INDEX: 31.08 KG/M2 | HEIGHT: 66 IN | OXYGEN SATURATION: 98 % | HEART RATE: 102 BPM | TEMPERATURE: 97.9 F | WEIGHT: 193.4 LBS

## 2024-02-26 DIAGNOSIS — Z00.01 ENCOUNTER FOR ANNUAL GENERAL MEDICAL EXAMINATION WITH ABNORMAL FINDINGS IN ADULT: Primary | ICD-10-CM

## 2024-02-26 DIAGNOSIS — E78.49 OTHER HYPERLIPIDEMIA: ICD-10-CM

## 2024-02-26 DIAGNOSIS — R79.89 ELEVATED LFTS: ICD-10-CM

## 2024-02-26 DIAGNOSIS — E66.09 CLASS 1 OBESITY DUE TO EXCESS CALORIES WITHOUT SERIOUS COMORBIDITY WITH BODY MASS INDEX (BMI) OF 31.0 TO 31.9 IN ADULT: ICD-10-CM

## 2024-02-26 DIAGNOSIS — F43.22 ADJUSTMENT DISORDER WITH ANXIETY: ICD-10-CM

## 2024-02-26 LAB
ALBUMIN SERPL-MCNC: 4 G/DL (ref 3.5–5.2)
ALBUMIN/GLOB SERPL: 1.4 G/DL
ALP SERPL-CCNC: 94 U/L (ref 39–117)
ALT SERPL-CCNC: 51 U/L (ref 1–33)
AST SERPL-CCNC: 30 U/L (ref 1–32)
BASOPHILS # BLD AUTO: 0.03 10*3/MM3 (ref 0–0.2)
BASOPHILS NFR BLD AUTO: 0.6 % (ref 0–1.5)
BILIRUB SERPL-MCNC: 0.3 MG/DL (ref 0–1.2)
BUN SERPL-MCNC: 12 MG/DL (ref 6–20)
BUN/CREAT SERPL: 12.1 (ref 7–25)
CALCIUM SERPL-MCNC: 8.8 MG/DL (ref 8.6–10.5)
CHLORIDE SERPL-SCNC: 105 MMOL/L (ref 98–107)
CHOLEST SERPL-MCNC: 190 MG/DL (ref 0–200)
CO2 SERPL-SCNC: 23.8 MMOL/L (ref 22–29)
CREAT SERPL-MCNC: 0.99 MG/DL (ref 0.57–1)
EGFRCR SERPLBLD CKD-EPI 2021: 74.1 ML/MIN/1.73
EOSINOPHIL # BLD AUTO: 0.1 10*3/MM3 (ref 0–0.4)
EOSINOPHIL NFR BLD AUTO: 1.8 % (ref 0.3–6.2)
ERYTHROCYTE [DISTWIDTH] IN BLOOD BY AUTOMATED COUNT: 11.6 % (ref 12.3–15.4)
GLOBULIN SER CALC-MCNC: 2.8 GM/DL
GLUCOSE SERPL-MCNC: 76 MG/DL (ref 65–99)
HCT VFR BLD AUTO: 42.9 % (ref 34–46.6)
HDLC SERPL-MCNC: 33 MG/DL (ref 40–60)
HGB BLD-MCNC: 14.3 G/DL (ref 12–15.9)
IMM GRANULOCYTES # BLD AUTO: 0.02 10*3/MM3 (ref 0–0.05)
IMM GRANULOCYTES NFR BLD AUTO: 0.4 % (ref 0–0.5)
LDLC SERPL CALC-MCNC: 129 MG/DL (ref 0–100)
LYMPHOCYTES # BLD AUTO: 1.67 10*3/MM3 (ref 0.7–3.1)
LYMPHOCYTES NFR BLD AUTO: 30.7 % (ref 19.6–45.3)
MCH RBC QN AUTO: 28.7 PG (ref 26.6–33)
MCHC RBC AUTO-ENTMCNC: 33.3 G/DL (ref 31.5–35.7)
MCV RBC AUTO: 86.1 FL (ref 79–97)
MONOCYTES # BLD AUTO: 0.51 10*3/MM3 (ref 0.1–0.9)
MONOCYTES NFR BLD AUTO: 9.4 % (ref 5–12)
NEUTROPHILS # BLD AUTO: 3.11 10*3/MM3 (ref 1.7–7)
NEUTROPHILS NFR BLD AUTO: 57.1 % (ref 42.7–76)
NRBC BLD AUTO-RTO: 0 /100 WBC (ref 0–0.2)
PLATELET # BLD AUTO: 215 10*3/MM3 (ref 140–450)
POTASSIUM SERPL-SCNC: 4.5 MMOL/L (ref 3.5–5.2)
PROT SERPL-MCNC: 6.8 G/DL (ref 6–8.5)
RBC # BLD AUTO: 4.98 10*6/MM3 (ref 3.77–5.28)
SODIUM SERPL-SCNC: 142 MMOL/L (ref 136–145)
TRIGL SERPL-MCNC: 155 MG/DL (ref 0–150)
TSH SERPL DL<=0.005 MIU/L-ACNC: 1.69 UIU/ML (ref 0.27–4.2)
VLDLC SERPL CALC-MCNC: 28 MG/DL (ref 5–40)
WBC # BLD AUTO: 5.44 10*3/MM3 (ref 3.4–10.8)

## 2024-02-26 PROCEDURE — 99396 PREV VISIT EST AGE 40-64: CPT | Performed by: FAMILY MEDICINE

## 2024-02-26 NOTE — PROGRESS NOTES
Subjective   Bren Lynch is a 39 y.o. female who presents for annual female wellness exam.  Chief Complaint   Patient presents with    Annual Exam    Cough    Nasal Congestion     Patient presents for her annual and follow-up on her hyperlipidemia and elevated LFTs.  She recently has had a head cold that is getting better.  She still has some sore throat and congestion but improving.  Her children have been sick as well.  She is up-to-date with her gynecologist.  She did not fast this morning.  She has been working on improving her diet and increasing her exercise with her kids.  Menstrual History: none with mirena  Pregnancy History: , s/p CS  Sexual History:    Contraception: IUD  Hormone Replacement Therapy: NA  Diet: standard but healthier  Exercise: walking and other exercises with her kids    Mammogram: pending  Pap Smear: 2024  Bone Density:   Colon Cancer Screening: na    Immunization History   Administered Date(s) Administered    COVID-19 (MODERNA) 1st,2nd,3rd Dose Monovalent 2021, 2022    Flu Vaccine Intradermal Quad 18-64YR 10/16/2019    Flublok 18+yrs 10/28/2019    Fluzone (or Fluarix & Flulaval for VFC) >6mos 10/05/2022, 2023    Hpv9 2022, 2022, 2022    Influenza, Unspecified 10/05/2022    Tdap 2020       The following portions of the patient's history were reviewed and updated as appropriate: allergies, current medications, past family history, past medical history, past social history, past surgical history and problem list.    Past Medical History:   Diagnosis Date    ADHD (attention deficit hyperactivity disorder)     I was diagnosed in 2nd grade    Anxiety     Depression        Past Surgical History:   Procedure Laterality Date     SECTION       SECTION      TONSILLECTOMY  1986       Family History   Problem Relation Age of Onset    Breast cancer Mother 58    Hypertension Mother     Hyperlipidemia Mother     Other  "Mother         BLOOD CLOTS    Liver cancer Mother 65    Bone cancer Mother 65    Anxiety disorder Mother     Hypertension Father     Hyperlipidemia Father     Lung cancer Maternal Uncle 56    Brain cancer Maternal Uncle 56    Bone cancer Maternal Uncle 56    Stroke Maternal Grandfather     Lung cancer Maternal Grandfather     Emphysema Maternal Grandfather     Osteoporosis Maternal Grandmother     Dementia Maternal Grandmother     Stroke Paternal Grandfather     Prostate cancer Paternal Grandfather     Uterine cancer Paternal Grandmother 60    Cleft palate Cousin     ADD / ADHD Cousin     Mental retardation Cousin     Von Willebrand disease Cousin     Anxiety disorder Son     Ovarian cancer Neg Hx     Colon cancer Neg Hx     Melanoma Neg Hx        Social History     Socioeconomic History    Marital status:     Number of children: 2    Years of education: 12    Highest education level: Some college, no degree   Tobacco Use    Smoking status: Never     Passive exposure: Never    Smokeless tobacco: Never   Vaping Use    Vaping Use: Never used   Substance and Sexual Activity    Alcohol use: No    Drug use: No    Sexual activity: Not Currently     Partners: Male     Birth control/protection: I.U.D.     Comment: Mirena 2/2017         Current Outpatient Medications:     buPROPion XL (Wellbutrin XL) 150 MG 24 hr tablet, Take 1 tablet by mouth Every Morning., Disp: 90 tablet, Rfl: 2    cetirizine (zyrTEC) 10 MG tablet, Take 1 tablet by mouth Daily., Disp: , Rfl:     escitalopram (LEXAPRO) 10 MG tablet, Take 1 tablet by mouth Daily., Disp: 90 tablet, Rfl: 3    Levonorgestrel (Mirena, 52 MG,) 20 MCG/DAY intrauterine device IUD, To be inserted one time by prescriber. Route intrauterine., Disp: 1 each, Rfl: 0    Review of Systems    Objective   Vitals:    02/26/24 1002   BP: 124/76   Pulse: 102   Temp: 97.9 °F (36.6 °C)   TempSrc: Oral   SpO2: 98%   Weight: 87.7 kg (193 lb 6.4 oz)   Height: 167.6 cm (65.98\")     Body " mass index is 31.23 kg/m².  Physical Exam  Vitals and nursing note reviewed.   Constitutional:       General: She is not in acute distress.     Appearance: Normal appearance. She is well-developed.   HENT:      Head: Normocephalic and atraumatic.      Right Ear: Tympanic membrane, ear canal and external ear normal.      Left Ear: Tympanic membrane, ear canal and external ear normal.      Nose: Nose normal.      Mouth/Throat:      Mouth: Mucous membranes are moist.      Pharynx: Oropharynx is clear. No oropharyngeal exudate or posterior oropharyngeal erythema.   Eyes:      Conjunctiva/sclera: Conjunctivae normal.      Pupils: Pupils are equal, round, and reactive to light.   Neck:      Thyroid: No thyromegaly.   Cardiovascular:      Rate and Rhythm: Normal rate and regular rhythm.      Heart sounds: No murmur heard.  Pulmonary:      Effort: Pulmonary effort is normal.      Breath sounds: Normal breath sounds. No wheezing.   Abdominal:      General: Abdomen is flat. Bowel sounds are normal. There is no distension.      Palpations: Abdomen is soft. There is no mass.      Tenderness: There is no abdominal tenderness.      Hernia: No hernia is present.   Musculoskeletal:         General: No swelling. Normal range of motion.      Cervical back: Normal range of motion and neck supple.      Right lower leg: No edema.      Left lower leg: No edema.   Lymphadenopathy:      Cervical: No cervical adenopathy.   Skin:     General: Skin is warm and dry.      Capillary Refill: Capillary refill takes less than 2 seconds.      Findings: No rash.   Neurological:      General: No focal deficit present.      Mental Status: She is alert and oriented to person, place, and time.      Cranial Nerves: No cranial nerve deficit.   Psychiatric:         Mood and Affect: Mood normal.         Behavior: Behavior normal.           Assessment & Plan   Diagnoses and all orders for this visit:    1. Encounter for annual general medical examination with  abnormal findings in adult (Primary)    2. Adjustment disorder with anxiety    3. Class 1 obesity due to excess calories without serious comorbidity with body mass index (BMI) of 31.0 to 31.9 in adult    4. Other hyperlipidemia  -     TSH  -     Lipid Panel  -     CBC & Differential  -     Comprehensive Metabolic Panel    5. Elevated LFTs  -     Comprehensive Metabolic Panel      Obesity and hyperlipidemia-check labs today, work hard on weight, will recheck LFTs as well which is likely from fatty liver  Health maintenance-up-to-date         Discussed the importance of maintaining a healthy weight and getting regular exercise.  Educated patient on the benefits of healthy diet.  Advise follow-up annually for wellness exams.    There are no Patient Instructions on file for this visit.

## 2024-02-27 DIAGNOSIS — F90.0 ADHD (ATTENTION DEFICIT HYPERACTIVITY DISORDER), INATTENTIVE TYPE: Primary | ICD-10-CM

## 2024-02-27 LAB
AMPHETAMINES SERPL QL SCN: NEGATIVE NG/ML
BARBITURATES SERPL QL SCN: NEGATIVE UG/ML
BENZODIAZ SERPL QL SCN: NEGATIVE NG/ML
CANNABINOIDS SERPL QL SCN: NEGATIVE NG/ML
COCAINE+BZE SERPL QL SCN: NEGATIVE NG/ML
METHADONE SERPL QL SCN: NEGATIVE NG/ML
OPIATES SERPL QL SCN: NEGATIVE NG/ML
OXYCODONE+OXYMORPHONE SERPLBLD QL SCN: NEGATIVE NG/ML
PCP SERPL QL SCN: NEGATIVE NG/ML
PROPOXYPH SERPL QL SCN: NEGATIVE NG/ML

## 2024-02-27 RX ORDER — DEXTROAMPHETAMINE SACCHARATE, AMPHETAMINE ASPARTATE MONOHYDRATE, DEXTROAMPHETAMINE SULFATE AND AMPHETAMINE SULFATE 2.5; 2.5; 2.5; 2.5 MG/1; MG/1; MG/1; MG/1
10 CAPSULE, EXTENDED RELEASE ORAL EVERY MORNING
Qty: 30 CAPSULE | Refills: 0 | Status: SHIPPED | OUTPATIENT
Start: 2024-02-27

## 2024-03-07 ENCOUNTER — TELEMEDICINE (OUTPATIENT)
Dept: PSYCHIATRY | Facility: CLINIC | Age: 39
End: 2024-03-07
Payer: COMMERCIAL

## 2024-03-07 DIAGNOSIS — F90.0 ADHD (ATTENTION DEFICIT HYPERACTIVITY DISORDER), INATTENTIVE TYPE: Primary | ICD-10-CM

## 2024-03-07 NOTE — PATIENT INSTRUCTIONS
For concerns or needing assistance call the Behavioral Health [facility] at [phone]  Increase Adderall XR to 2 of the 10 mg XR capsules each morning on Saturday and Sunday  Monitor for increase in somnolence, tiredness.  Call office at above number on Monday and I will return call and we will discuss if we need to increase the medication or change medications at that time.  I will make appointment for follow-up at that time as well

## 2024-03-07 NOTE — PROGRESS NOTES
"This provider is located at [facility], [address] [place] [address], [facility], [id number] using a secure [employer] Video Visit through [employer]. Patient is being seen remotely via telehealth at their work address in [place], and stated they are in a secure environment for this session. The patient's condition being diagnosed/treated is appropriate for telemedicine. The provider identified herself as well as her credentials.   The patient, and/or patients guardian, consent to be seen remotely, and when consent is given they understand that the consent allows for patient identifiable information to be sent to a third party as needed.   They may refuse to be seen remotely at any time. The electronic data is encrypted and password protected, and the patient and/or guardian has been advised of the potential risks to privacy not withstanding such measures.   PT Identifiers used: Name and .    You have chosen to receive care through a telehealth visit.  Do you consent to use a video/audio connection for your medical care today? Yes      Subjective   Bren Lynch is a 39 y.o. 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  Patient reported above chief complaint.  States diagnosed with attention deficit disorder by pediatric psychology when she was in elementary school.  She was on medication but not sure what it was, possibly Ritalin.  She only took it for a couple years and then was not on medication but was  an average student.  Struggled in math.  No history of hyperactivity.  No history of adverse childhood experiences, reports childhood was without trauma.  Patient reports as she is getting older she is finding it more difficult to stay on task, she gets easily distracted, difficulty with concentration and focus and " "follow through on her task.  This has affected her work quality.     Patient does report history of some anxiety and has been on Lexapro for a while and reports this has been helpful.  LUANNE-7 today is scored at 0.  Patient has been on Wellbutrin currently at 300 mg XL, had been on this medicine for about the last 7 months with hopes it would help with attention deficit but did not.  She does report however she has some improvement in her mood overall.  PHQ-9 score today at 2.  Dosage of Wellbutrin was reduced to 150 mg XL every morning with anticipating starting Adderall medication for ADHD symptoms.  Patient reports that she had difficulty getting the urine drug screen so an order was sent for serum and this of course was negative.  Patient has been on Adderall XR 10 mg every morning for about a week.  Patient reports no side effects except for she gets waves of feeling very tired throughout the day.  She also reports she was really sick a week prior to starting her medication she had a lot of congestion and a sore throat.  However she does not report getting these waves of tiredness until she actually started taking the Adderall.  Her appetite has been decreased a little but she reports \"that is not a bad thing.\"  Discussed increasing the dose to 20 mg XR every morning on Saturday and Sunday, March 9 and 10th.  I would not want her to do medication adjustment tomorrow because she will be working and if the tiredness is worsened by increasing the dose it would be very difficult for her to work.  Patient is instructed to take 2 of the 10 mg XR capsules on Saturday and Sunday mornings.  She will call the office on Monday and then I will return her call and we will discuss if we need to increase the dose, or change medications.              PHQ-9 Depression Screening  Little interest or pleasure in doing things? (P) 0-->not at all   Feeling down, depressed, or hopeless? (P) 0-->not at all   Trouble falling or staying " asleep, or sleeping too much? (P) 1-->several days   Feeling tired or having little energy? (P) 1-->several days   Poor appetite or overeating? (P) 0-->not at all   Feeling bad about yourself - or that you are a failure or have let yourself or your family down? (P) 0-->not at all   Trouble concentrating on things, such as reading the newspaper or watching television? (P) 0-->not at all   Moving or speaking so slowly that other people could have noticed? Or the opposite - being so fidgety or restless that you have been moving around a lot more than usual? (P) 0-->not at all   Thoughts that you would be better off dead, or of hurting yourself in some way? (P) 0-->not at all   PHQ-9 Total Score (P) 2   If you checked off any problems, how difficult have these problems made it for you to do your work, take care of things at home, or get along with other people? (P) somewhat difficult     PHQ-9 Total Score: (P) 2      LUANNE-7  Feeling nervous, anxious or on edge: (P) Not at all  Not being able to stop or control worrying: (P) Not at all  Worrying too much about different things: (P) Not at all  Trouble Relaxing: (P) Not at all  Being so restless that it is hard to sit still: (P) Not at all  Feeling afraid as if something awful might happen: (P) Not at all  Becoming easily annoyed or irritable: (P) Not at all  LUANNE 7 Total Score: (P) 0  If you checked any problems, how difficult have these problems made it for you to do your work, take care of things at home, or get along with other people: (P) Not difficult at all    Last Menstrual Period:  Mirena IUD, not currently having menses.    The following portions of the patient's history were reviewed and updated as appropriate: allergies, current medications, past family history, past medical history, past social history, past surgical history and problem list.    Past Psychiatric History:  Began Treatment: as a child in elementary school  Diagnoses:Anxiety and  ADHD  Psychiatrist: saw provider at pediatric psychology, Dr. Al Diaz  Therapist: therapy when getting   Admission History:Denies  Medication Trials: unsure, thinks ritalin; zoloft gave somnolence. Has been on Wellbutrin for about 6 mos, currently at 300mg XL daily as of 02/15/2024. For ADHD. Not effective but has had some benefit for mood. Lexapro for some time for anxiety.   Self Harm: Denies  Suicide Attempts:Denies   Psychosis, Anxiety, Depression: Denies  Denies any history of a head injury or seizure.  Denies any history of psychosis or hallucinations.    Past Medical History:  Past Medical History:   Diagnosis Date    ADHD (attention deficit hyperactivity disorder)     I was diagnosed in 2nd grade    Anxiety     Depression        Substance Abuse History:   Types:Denies all, including illicit        Social History:  Social History     Socioeconomic History    Marital status:     Number of children: 2    Years of education: 12    Highest education level: Some college, no degree   Tobacco Use    Smoking status: Never     Passive exposure: Never    Smokeless tobacco: Never   Vaping Use    Vaping status: Never Used   Substance and Sexual Activity    Alcohol use: No    Drug use: No    Sexual activity: Not Currently     Partners: Male     Birth control/protection: I.U.D.     Comment: Mirena 2017   Patient reports support system consists of her sister and her father.  Patient has been  and  one time.  [employer] preference is [employer], denied any history of legal problems.  Denied any history of  service.    Family History:  Family History   Problem Relation Age of Onset    Breast cancer Mother 58    Hypertension Mother     Hyperlipidemia Mother     Other Mother         BLOOD CLOTS    Liver cancer Mother 65    Bone cancer Mother 65    Anxiety disorder Mother     Hypertension Father     Hyperlipidemia Father     Lung cancer Maternal Uncle 56    Brain cancer  Maternal Uncle 56    Bone cancer Maternal Uncle 56    Stroke Maternal Grandfather     Lung cancer Maternal Grandfather     Emphysema Maternal Grandfather     Osteoporosis Maternal Grandmother     Dementia Maternal Grandmother     Stroke Paternal Grandfather     Prostate cancer Paternal Grandfather     Uterine cancer Paternal Grandmother 60    Cleft palate Cousin     ADD / ADHD Cousin     Mental retardation Cousin     Von Willebrand disease Cousin     Anxiety disorder Son     Ovarian cancer Neg Hx     Colon cancer Neg Hx     Melanoma Neg Hx        Past Surgical History:  Past Surgical History:   Procedure Laterality Date     SECTION  2010     SECTION  2013    TONSILLECTOMY  1986       Problem List:  Patient Active Problem List   Diagnosis    IUD (intrauterine device) in place    Family history of breast cancer       Allergy:   No Known Allergies     Current Medications:   Current Outpatient Medications   Medication Sig Dispense Refill    amphetamine-dextroamphetamine XR (Adderall XR) 10 MG 24 hr capsule Take 1 capsule by mouth Every Morning 30 capsule 0    buPROPion XL (Wellbutrin XL) 150 MG 24 hr tablet Take 1 tablet by mouth Every Morning. 90 tablet 2    cetirizine (zyrTEC) 10 MG tablet Take 1 tablet by mouth Daily.      escitalopram (LEXAPRO) 10 MG tablet Take 1 tablet by mouth Daily. 90 tablet 3    Levonorgestrel (Mirena, 52 MG,) 20 MCG/DAY intrauterine device IUD To be inserted one time by prescriber. Route intrauterine. 1 each 0     No current facility-administered medications for this visit.       Review of Systems:    Review of Systems   Psychiatric/Behavioral:  Positive for decreased concentration.    All other systems reviewed and are negative.        Physical Exam:   Physical Exam  Vitals reviewed.   Constitutional:       Appearance: Normal appearance. She is well-developed and well-groomed.   HENT:      Head: Normocephalic.   Neurological:      Mental Status: She is alert.   Psychiatric:          Attention and Perception: Attention and perception normal.         Mood and Affect: Mood and affect normal.         Speech: Speech normal.         Behavior: Behavior normal. Behavior is cooperative.         Thought Content: Thought content normal.         Cognition and Memory: Cognition normal. She exhibits impaired recent memory (Can be forgetful).         Judgment: Judgment normal.         Vitals:  not currently breastfeeding. There is no height or weight on file to calculate BMI.  Due to extenuating circumstances and possible current health risks associated with the patient being present in a clinical setting (with current health restrictions in place in regards to possible COVID 19 transmission/exposure), the patient was seen remotely today via a MyChart Video Visit through [facility] and telephone encounter.  Unable to obtain vital signs due to nature of remote visit.  Height stated at 66 inches.  Weight stated at 190 pounds.    Last 3 Blood Pressure Readings:  BP Readings from Last 3 Encounters:   02/26/24 124/76   01/17/24 120/70   09/07/23 118/68          Mental Status Exam:   Hygiene:   good  Cooperation:  Cooperative  Eye Contact:  Good  Psychomotor Behavior:  Appropriate  Affect:  Full range  Mood: euthymic  Hopelessness: Denies  Speech:  Normal  Thought Process:  Goal directed and Linear  Thought Content:  Normal  Suicidal:  None  Homicidal:  None  Hallucinations:  None  Delusion:  None  Memory:   Reports can be forgetful  Orientation:  Person, Place, Time, and Situation  Reliability:  good  Insight:  Good  Judgement:  Good  Impulse Control:  Good  Physical/Medical Issues:  No        Lab Results:   Office Visit on 02/26/2024   Component Date Value Ref Range Status    TSH 02/26/2024 1.690  0.270 - 4.200 uIU/mL Final    Total Cholesterol 02/26/2024 190  0 - 200 mg/dL Final    Comment: Cholesterol Reference Ranges  (U.S. Department of Health and Human Services ATP III  Classifications)  Desirable           <200 mg/dL  Borderline High    200-239 mg/dL  High Risk          >240 mg/dL  Triglyceride Reference Ranges  (U.S. Department of Health and Human Services ATP III  Classifications)  Normal           <150 mg/dL  Borderline High  150-199 mg/dL  High             200-499 mg/dL  Very High        >500 mg/dL  HDL Reference Ranges  (U.S. Department of Health and Human Services ATP III  Classifications)  Low     <40 mg/dl (major risk factor for CHD)  High    >60 mg/dl ('negative' risk factor for CHD)  LDL Reference Ranges  (U.S. Department of Health and Human Services ATP III  Classifications)  Optimal          <100 mg/dL  Near Optimal     100-129 mg/dL  Borderline High  130-159 mg/dL  High             160-189 mg/dL  Very High        >189 mg/dL      Triglycerides 02/26/2024 155 (H)  0 - 150 mg/dL Final    HDL Cholesterol 02/26/2024 33 (L)  40 - 60 mg/dL Final    VLDL Cholesterol Dennis 02/26/2024 28  5 - 40 mg/dL Final    LDL Chol Calc (NIH) 02/26/2024 129 (H)  0 - 100 mg/dL Final    WBC 02/26/2024 5.44  3.40 - 10.80 10*3/mm3 Final    RBC 02/26/2024 4.98  3.77 - 5.28 10*6/mm3 Final    Hemoglobin 02/26/2024 14.3  12.0 - 15.9 g/dL Final    Hematocrit 02/26/2024 42.9  34.0 - 46.6 % Final    MCV 02/26/2024 86.1  79.0 - 97.0 fL Final    MCH 02/26/2024 28.7  26.6 - 33.0 pg Final    MCHC 02/26/2024 33.3  31.5 - 35.7 g/dL Final    RDW 02/26/2024 11.6 (L)  12.3 - 15.4 % Final    Platelets 02/26/2024 215  140 - 450 10*3/mm3 Final    Neutrophil Rel % 02/26/2024 57.1  42.7 - 76.0 % Final    Lymphocyte Rel % 02/26/2024 30.7  19.6 - 45.3 % Final    Monocyte Rel % 02/26/2024 9.4  5.0 - 12.0 % Final    Eosinophil Rel % 02/26/2024 1.8  0.3 - 6.2 % Final    Basophil Rel % 02/26/2024 0.6  0.0 - 1.5 % Final    Neutrophils Absolute 02/26/2024 3.11  1.70 - 7.00 10*3/mm3 Final    Lymphocytes Absolute 02/26/2024 1.67  0.70 - 3.10 10*3/mm3 Final    Monocytes Absolute 02/26/2024 0.51  0.10 - 0.90 10*3/mm3 Final    Eosinophils Absolute 02/26/2024 0.10   0.00 - 0.40 10*3/mm3 Final    Basophils Absolute 02/26/2024 0.03  0.00 - 0.20 10*3/mm3 Final    Immature Granulocyte Rel % 02/26/2024 0.4  0.0 - 0.5 % Final    Immature Grans Absolute 02/26/2024 0.02  0.00 - 0.05 10*3/mm3 Final    nRBC 02/26/2024 0.0  0.0 - 0.2 /100 WBC Final    Glucose 02/26/2024 76  65 - 99 mg/dL Final    BUN 02/26/2024 12  6 - 20 mg/dL Final    Creatinine 02/26/2024 0.99  0.57 - 1.00 mg/dL Final    EGFR Result 02/26/2024 74.1  >60.0 mL/min/1.73 Final    Comment: GFR Normal >60  Chronic Kidney Disease <60  Kidney Failure <15      BUN/Creatinine Ratio 02/26/2024 12.1  7.0 - 25.0 Final    Sodium 02/26/2024 142  136 - 145 mmol/L Final    Potassium 02/26/2024 4.5  3.5 - 5.2 mmol/L Final    Chloride 02/26/2024 105  98 - 107 mmol/L Final    Total CO2 02/26/2024 23.8  22.0 - 29.0 mmol/L Final    Calcium 02/26/2024 8.8  8.6 - 10.5 mg/dL Final    Total Protein 02/26/2024 6.8  6.0 - 8.5 g/dL Final    Albumin 02/26/2024 4.0  3.5 - 5.2 g/dL Final    Globulin 02/26/2024 2.8  gm/dL Final    A/G Ratio 02/26/2024 1.4  g/dL Final    Total Bilirubin 02/26/2024 0.3  0.0 - 1.2 mg/dL Final    Alkaline Phosphatase 02/26/2024 94  39 - 117 U/L Final    AST (SGOT) 02/26/2024 30  1 - 32 U/L Final    ALT (SGPT) 02/26/2024 51 (H)  1 - 33 U/L Final   Orders Only on 02/20/2024   Component Date Value Ref Range Status    Amphetamine Screen 02/20/2024 Negative  Cutoff:50 ng/mL Final    Barbiturates Screen 02/20/2024 Negative  Cutoff:0.1 ug/mL Final    Benzodiazepine Screen 02/20/2024 Negative  Cutoff:20 ng/mL Final    Cocaine + Metabolite Screen 02/20/2024 Negative  Cutoff:25 ng/mL Final    Phencyclidine Screen 02/20/2024 Negative  Cutoff:8 ng/mL Final    THC, Screen 02/20/2024 Negative  Cutoff:5 ng/mL Final    Opiates 02/20/2024 Negative  Cutoff:5 ng/mL Final    Oxycodone 02/20/2024 Negative  Cutoff:5 ng/mL Final    Methadone Screen 02/20/2024 Negative  Cutoff:25 ng/mL Final    Propoxyphene 02/20/2024 Negative  Cutoff:50 ng/mL  "Final    Comment: This test was developed and its performance characteristics  determined by [employer].  It has not been cleared or approved  by the Food and Drug Administration.                Assessment & Plan   Diagnoses and all orders for this visit:    1. ADHD (attention deficit hyperactivity disorder), inattentive type (Primary)          Visit Diagnoses:    ICD-10-CM ICD-9-CM   1. ADHD (attention deficit hyperactivity disorder), inattentive type  F90.0 314.00           GOALS:  Short Term Goals: Patient will be compliant with medication, and patient will have no significant medication related side effects.  Patient will be engaged in psychotherapy as indicated.  Patient will report subjective improvement of symptoms.  Long term goals: To stabilize mood and treat/improve subjective symptoms, the patient will stay out of the hospital, the patient will be at an optimal level of functioning, and the patient will take all medications as prescribed.  The patient/guardian verbalized understanding and agreement with goals that were mutually set.    RISK ASSESSMENT  Current harm-to-self risk is reported by pt as \"none.\"  Current zkau-ob-makktl risk is reported by pt as \"none.\"   No suicidal thoughts, intent, plan is appreciated by this provider on this date of exam.   No homicidal thoughts, intent, plan is appreciated by this provider on this date.    TREATMENT PLAN: Continue supportive psychotherapy efforts and medications as indicated.   Pharmacological and Non-Pharmacological treatment options discussed during today's visit. Patient/Guardian acknowledged and verbally consented with current treatment plan and was educated on the importance of compliance with treatment and follow-up appointments.      MEDICATION ISSUES:  Discussed medication options and treatment plan of prescribed medication as well as the risks, benefits, any black box warnings, and side effects including potential falls, possible impaired driving, and " metabolic adversities among others. Patient is agreeable to call the office with any worsening of symptoms or onset of side effects, or if any concerns or questions arise.  The contact information for the office is made available to the patient. Patient is agreeable to call 911 or go to the nearest ER should they begin having any SI/HI, or if any urgent concerns arise. No medication side effects or related complaints today.      Increase Adderall XR to 2 of the 10 mg XR capsules each morning on Saturday and Sunday  Monitor for increase in somnolence, tiredness.  Call office at above number on Monday and I will return call and we will discuss if we need to increase the medication or change medications at that time.  I will make appointment for follow-up at that time as well    MEDS ORDERED DURING VISIT:  No orders of the defined types were placed in this encounter.      Follow Up Appointment:   No follow-ups on file.               This document has been electronically signed by KESHIA Balderas  March 7, 2024 09:47 EST    Dictated Utilizing Dragon Dictation: Part of this note may be an electronic transcription/translation of spoken language to printed text using the Dragon Dictation System.

## 2024-03-11 ENCOUNTER — TELEPHONE (OUTPATIENT)
Dept: PSYCHIATRY | Facility: CLINIC | Age: 39
End: 2024-03-11
Payer: COMMERCIAL

## 2024-03-11 DIAGNOSIS — F90.0 ADHD (ATTENTION DEFICIT HYPERACTIVITY DISORDER), INATTENTIVE TYPE: Primary | ICD-10-CM

## 2024-03-11 RX ORDER — DEXTROAMPHETAMINE SACCHARATE, AMPHETAMINE ASPARTATE MONOHYDRATE, DEXTROAMPHETAMINE SULFATE AND AMPHETAMINE SULFATE 5; 5; 5; 5 MG/1; MG/1; MG/1; MG/1
20 CAPSULE, EXTENDED RELEASE ORAL EVERY MORNING
Qty: 30 CAPSULE | Refills: 0 | Status: SHIPPED | OUTPATIENT
Start: 2024-03-11

## 2024-03-11 NOTE — TELEPHONE ENCOUNTER
Pt left a vm stating provider switched her dosage on a medication for the weekend and the 20 mg worked much better for her and could really tell a difference. Pt did not state the name of the medication. Tried to call pt back and had to leave a vm.

## 2024-03-11 NOTE — TELEPHONE ENCOUNTER
Pt called back and was made aware that the prescription had been sent to the pharmacy of the Adderall XR 20 mg.

## 2024-04-11 DIAGNOSIS — F43.22 ADJUSTMENT DISORDER WITH ANXIETY: ICD-10-CM

## 2024-04-11 RX ORDER — ESCITALOPRAM OXALATE 10 MG/1
10 TABLET ORAL DAILY
Qty: 90 TABLET | Refills: 3 | Status: SHIPPED | OUTPATIENT
Start: 2024-04-11

## 2024-04-16 DIAGNOSIS — F90.0 ADHD (ATTENTION DEFICIT HYPERACTIVITY DISORDER), INATTENTIVE TYPE: ICD-10-CM

## 2024-04-16 RX ORDER — DEXTROAMPHETAMINE SACCHARATE, AMPHETAMINE ASPARTATE MONOHYDRATE, DEXTROAMPHETAMINE SULFATE AND AMPHETAMINE SULFATE 5; 5; 5; 5 MG/1; MG/1; MG/1; MG/1
20 CAPSULE, EXTENDED RELEASE ORAL EVERY MORNING
Qty: 30 CAPSULE | Refills: 0 | Status: SHIPPED | OUTPATIENT
Start: 2024-04-16

## 2024-04-22 RX ORDER — AMOXICILLIN 500 MG/1
500 CAPSULE ORAL 2 TIMES DAILY
Qty: 20 CAPSULE | Refills: 0 | Status: SHIPPED | OUTPATIENT
Start: 2024-04-22 | End: 2024-05-02

## 2024-04-25 DIAGNOSIS — F90.0 ADHD (ATTENTION DEFICIT HYPERACTIVITY DISORDER), INATTENTIVE TYPE: ICD-10-CM

## 2024-04-25 RX ORDER — DEXTROAMPHETAMINE SACCHARATE, AMPHETAMINE ASPARTATE MONOHYDRATE, DEXTROAMPHETAMINE SULFATE AND AMPHETAMINE SULFATE 5; 5; 5; 5 MG/1; MG/1; MG/1; MG/1
20 CAPSULE, EXTENDED RELEASE ORAL EVERY MORNING
Qty: 30 CAPSULE | Refills: 0 | Status: CANCELLED | OUTPATIENT
Start: 2024-04-25

## 2024-04-25 NOTE — TELEPHONE ENCOUNTER
Patient left vm on med line requesting refill on Adderall and for the refill to be sent to [facility] in [place], phone [phone].

## 2024-04-25 NOTE — TELEPHONE ENCOUNTER
She needs a follow up appt and I sent this in last week to [facility].   If she needs it sent to [facility], need to call other pharmacy and cancel the order.

## 2024-05-07 DIAGNOSIS — F90.0 ADHD (ATTENTION DEFICIT HYPERACTIVITY DISORDER), INATTENTIVE TYPE: ICD-10-CM

## 2024-05-07 RX ORDER — DEXTROAMPHETAMINE SACCHARATE, AMPHETAMINE ASPARTATE MONOHYDRATE, DEXTROAMPHETAMINE SULFATE AND AMPHETAMINE SULFATE 5; 5; 5; 5 MG/1; MG/1; MG/1; MG/1
20 CAPSULE, EXTENDED RELEASE ORAL EVERY MORNING
Qty: 30 CAPSULE | Refills: 0 | Status: CANCELLED | OUTPATIENT
Start: 2024-05-07

## 2024-05-08 ENCOUNTER — TELEPHONE (OUTPATIENT)
Dept: PSYCHIATRY | Facility: CLINIC | Age: 39
End: 2024-05-08
Payer: COMMERCIAL

## 2024-05-08 DIAGNOSIS — F90.0 ADHD (ATTENTION DEFICIT HYPERACTIVITY DISORDER), INATTENTIVE TYPE: ICD-10-CM

## 2024-05-08 RX ORDER — DEXTROAMPHETAMINE SACCHARATE, AMPHETAMINE ASPARTATE MONOHYDRATE, DEXTROAMPHETAMINE SULFATE AND AMPHETAMINE SULFATE 5; 5; 5; 5 MG/1; MG/1; MG/1; MG/1
20 CAPSULE, EXTENDED RELEASE ORAL EVERY MORNING
Qty: 30 CAPSULE | Refills: 0 | Status: SHIPPED | OUTPATIENT
Start: 2024-05-08

## 2024-05-28 RX ORDER — BUPROPION HYDROCHLORIDE 300 MG/1
300 TABLET ORAL DAILY
Qty: 30 TABLET | Refills: 2 | OUTPATIENT
Start: 2024-05-28

## 2024-06-05 ENCOUNTER — TELEMEDICINE (OUTPATIENT)
Dept: PSYCHIATRY | Facility: CLINIC | Age: 39
End: 2024-06-05
Payer: COMMERCIAL

## 2024-06-05 DIAGNOSIS — F41.1 GENERALIZED ANXIETY DISORDER: ICD-10-CM

## 2024-06-05 DIAGNOSIS — F90.0 ADHD (ATTENTION DEFICIT HYPERACTIVITY DISORDER), INATTENTIVE TYPE: Primary | ICD-10-CM

## 2024-06-05 PROCEDURE — 99214 OFFICE O/P EST MOD 30 MIN: CPT | Performed by: NURSE PRACTITIONER

## 2024-06-05 RX ORDER — BUPROPION HYDROCHLORIDE 300 MG/1
300 TABLET ORAL EVERY MORNING
Qty: 90 TABLET | Refills: 3 | Status: SHIPPED | OUTPATIENT
Start: 2024-06-05

## 2024-06-05 RX ORDER — DEXTROAMPHETAMINE SACCHARATE, AMPHETAMINE ASPARTATE MONOHYDRATE, DEXTROAMPHETAMINE SULFATE AND AMPHETAMINE SULFATE 5; 5; 5; 5 MG/1; MG/1; MG/1; MG/1
20 CAPSULE, EXTENDED RELEASE ORAL EVERY MORNING
Qty: 30 CAPSULE | Refills: 0 | Status: SHIPPED | OUTPATIENT
Start: 2024-06-05

## 2024-06-05 NOTE — PROGRESS NOTES
"This provider is located at [facility], [address] [place] [address], [facility], [id number] using a secure [employer] Video Visit through [employer]. Patient is being seen remotely via telehealth at their work address in [place], and stated they are in a secure environment for this session. The patient's condition being diagnosed/treated is appropriate for telemedicine. The provider identified herself as well as her credentials.   The patient, and/or patients guardian, consent to be seen remotely, and when consent is given they understand that the consent allows for patient identifiable information to be sent to a third party as needed.   They may refuse to be seen remotely at any time. The electronic data is encrypted and password protected, and the patient and/or guardian has been advised of the potential risks to privacy not withstanding such measures.   PT Identifiers used: Name and .    You have chosen to receive care through a telehealth visit.  Do you consent to use a video/audio connection for your medical care today? Yes  Patient verbally confirmed consent for today's encounter  2024      Subjective   Bren Lynch is a 39 y.o. 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  Patient reported above chief complaint.  States diagnosed with attention deficit disorder by pediatric psychology when she was in elementary school.  She was on medication but not sure what it was, possibly Ritalin.  She only took it for a couple years and then was not on medication but was  an average student.  Struggled in math.  No history of hyperactivity.  No history of adverse childhood experiences, reports childhood was without trauma.  Patient reports as she is getting older she is finding it more difficult to stay on task, she " gets easily distracted, difficulty with concentration and focus and follow through on her task.  This has affected her work quality.     Patient does report history of some anxiety and has been on Lexapro for a while and reports this has been helpful.  LUANNE-7 today is scored at 0.  PHQ-9 score today at 2.  Dosage of Wellbutrin was reduced to 150 mg XL every morning with anticipating starting Adderall medication for ADHD symptoms. Pt reports today she ran out of refills on the 150mg xl and for the last month has been taking the 300mgxl. Pt had her b/p checked by staff during appt and it was 120/72.   She thinks her focus and concentration has improved with the increase in the Wellbutrin.   Currently taking Adderall xr 20mg every morning and states she is doing well on this dosage. No se reported. Focus and concentration is good, job performance has improved as well.               PHQ-9 Depression Screening  Little interest or pleasure in doing things? (P) 0-->not at all   Feeling down, depressed, or hopeless? (P) 0-->not at all   Trouble falling or staying asleep, or sleeping too much? (P) 0-->not at all   Feeling tired or having little energy? (P) 1-->several days   Poor appetite or overeating? (P) 0-->not at all   Feeling bad about yourself - or that you are a failure or have let yourself or your family down? (P) 0-->not at all   Trouble concentrating on things, such as reading the newspaper or watching television? (P) 1-->several days   Moving or speaking so slowly that other people could have noticed? Or the opposite - being so fidgety or restless that you have been moving around a lot more than usual? (P) 0-->not at all   Thoughts that you would be better off dead, or of hurting yourself in some way? (P) 0-->not at all   PHQ-9 Total Score (P) 2   If you checked off any problems, how difficult have these problems made it for you to do your work, take care of things at home, or get along with other people? (P)  somewhat difficult     PHQ-9 Total Score: (P) 2      LUANNE-7  Feeling nervous, anxious or on edge: (P) Not at all  Not being able to stop or control worrying: (P) Not at all  Worrying too much about different things: (P) Not at all  Trouble Relaxing: (P) Not at all  Being so restless that it is hard to sit still: (P) Not at all  Feeling afraid as if something awful might happen: (P) Not at all  Becoming easily annoyed or irritable: (P) Not at all  LUANNE 7 Total Score: (P) 0  If you checked any problems, how difficult have these problems made it for you to do your work, take care of things at home, or get along with other people: (P) Not difficult at all    Last Menstrual Period:  Mirena IUD, not currently having menses.    The following portions of the patient's history were reviewed and updated as appropriate: allergies, current medications, past family history, past medical history, past social history, past surgical history and problem list.    Past Psychiatric History:  Began Treatment: as a child in elementary school  Diagnoses:Anxiety and ADHD  Psychiatrist: saw provider at pediatric psychology, Dr. Al Diaz  Therapist: therapy when getting   Admission History:Denies  Medication Trials: unsure, thinks ritalin; zoloft gave somnolence. Has been on Wellbutrin for about 6 mos, currently at 300mg XL daily as of 02/15/2024. For ADHD. Not effective but has had some benefit for mood. Lexapro for some time for anxiety.   Self Harm: Denies  Suicide Attempts:Denies   Psychosis, Anxiety, Depression: Denies  Denies any history of a head injury or seizure.  Denies any history of psychosis or hallucinations.    Past Medical History:  Past Medical History:   Diagnosis Date    ADHD (attention deficit hyperactivity disorder)     I was diagnosed in 2nd grade    Anxiety     Depression        Substance Abuse History:   Types:Denies all, including illicit        Social History:  Social History     Socioeconomic  History    Marital status:     Number of children: 2    Years of education: 12    Highest education level: Some college, no degree   Tobacco Use    Smoking status: Never     Passive exposure: Never    Smokeless tobacco: Never   Vaping Use    Vaping status: Never Used   Substance and Sexual Activity    Alcohol use: No    Drug use: No    Sexual activity: Not Currently     Partners: Male     Birth control/protection: I.U.D.     Comment: Mirena 2017   Patient reports support system consists of her sister and her father.  Patient has been  and  one time.  Buddhism preference is [employer], denied any history of legal problems.  Denied any history of  service.    Family History:  Family History   Problem Relation Age of Onset    Breast cancer Mother 58    Hypertension Mother     Hyperlipidemia Mother     Other Mother         BLOOD CLOTS    Liver cancer Mother 65    Bone cancer Mother 65    Anxiety disorder Mother     Hypertension Father     Hyperlipidemia Father     Lung cancer Maternal Uncle 56    Brain cancer Maternal Uncle 56    Bone cancer Maternal Uncle 56    Stroke Maternal Grandfather     Lung cancer Maternal Grandfather     Emphysema Maternal Grandfather     Osteoporosis Maternal Grandmother     Dementia Maternal Grandmother     Stroke Paternal Grandfather     Prostate cancer Paternal Grandfather     Uterine cancer Paternal Grandmother 60    Cleft palate Cousin     ADD / ADHD Cousin     Mental retardation Cousin     Von Willebrand disease Cousin     Anxiety disorder Son     Ovarian cancer Neg Hx     Colon cancer Neg Hx     Melanoma Neg Hx        Past Surgical History:  Past Surgical History:   Procedure Laterality Date    ABDOMINAL SURGERY      C-SEC     SECTION       SECTION      TONSILLECTOMY  1986       Problem List:  Patient Active Problem List   Diagnosis    IUD (intrauterine device) in place    Family history of breast cancer       Allergy:   No  Known Allergies     Current Medications:   Current Outpatient Medications   Medication Sig Dispense Refill    amphetamine-dextroamphetamine XR (Adderall XR) 20 MG 24 hr capsule Take 1 capsule by mouth Every Morning 30 capsule 0    cetirizine (zyrTEC) 10 MG tablet Take 1 tablet by mouth Daily.      escitalopram (LEXAPRO) 10 MG tablet TAKE ONE TABLET BY MOUTH EVERY DAY 90 tablet 3    Levonorgestrel (Mirena, 52 MG,) 20 MCG/DAY intrauterine device IUD To be inserted one time by prescriber. Route intrauterine. 1 each 0    buPROPion XL (Wellbutrin XL) 300 MG 24 hr tablet Take 1 tablet by mouth Every Morning. 90 tablet 3     No current facility-administered medications for this visit.       Review of Systems:    Review of Systems   Psychiatric/Behavioral:  Positive for decreased concentration.    All other systems reviewed and are negative.        Physical Exam:   Physical Exam  Vitals reviewed.   Constitutional:       Appearance: Normal appearance. She is well-developed and well-groomed.   HENT:      Head: Normocephalic.   Neurological:      Mental Status: She is alert.   Psychiatric:         Attention and Perception: Attention and perception normal.         Mood and Affect: Mood and affect normal.         Speech: Speech normal.         Behavior: Behavior normal. Behavior is cooperative.         Thought Content: Thought content normal.         Cognition and Memory: Cognition normal.         Judgment: Judgment normal.         Vitals:  not currently breastfeeding. There is no height or weight on file to calculate BMI.  Due to extenuating circumstances and possible current health risks associated with the patient being present in a clinical setting (with current health restrictions in place in regards to possible COVID 19 transmission/exposure), the patient was seen remotely today via a MyChart Video Visit through [facility] and telephone encounter.  Unable to obtain vital signs due to nature of remote visit.  Height stated at 66  inches.  Weight stated at 190 pounds.    Last 3 Blood Pressure Readings:  BP Readings from Last 3 Encounters:   02/26/24 124/76   01/17/24 120/70   09/07/23 118/68          Mental Status Exam:   Hygiene:   good  Cooperation:  Cooperative  Eye Contact:  Good  Psychomotor Behavior:  Appropriate  Affect:  Full range  Mood: euthymic  Hopelessness: Denies  Speech:  Normal  Thought Process:  Goal directed and Linear  Thought Content:  Normal  Suicidal:  None  Homicidal:  None  Hallucinations:  None  Delusion:  None  Memory:   Reports can be forgetful  Orientation:  Person, Place, Time, and Situation  Reliability:  good  Insight:  Good  Judgement:  Good  Impulse Control:  Good  Physical/Medical Issues:  No        Lab Results:   No new labs           Assessment & Plan   Diagnoses and all orders for this visit:    1. ADHD (attention deficit hyperactivity disorder), inattentive type (Primary)  -     amphetamine-dextroamphetamine XR (Adderall XR) 20 MG 24 hr capsule; Take 1 capsule by mouth Every Morning  Dispense: 30 capsule; Refill: 0  -     buPROPion XL (Wellbutrin XL) 300 MG 24 hr tablet; Take 1 tablet by mouth Every Morning.  Dispense: 90 tablet; Refill: 3    2. Generalized anxiety disorder  -     buPROPion XL (Wellbutrin XL) 300 MG 24 hr tablet; Take 1 tablet by mouth Every Morning.  Dispense: 90 tablet; Refill: 3            Visit Diagnoses:    ICD-10-CM ICD-9-CM   1. ADHD (attention deficit hyperactivity disorder), inattentive type  F90.0 314.00   2. Generalized anxiety disorder  F41.1 300.02             GOALS:  Short Term Goals: Patient will be compliant with medication, and patient will have no significant medication related side effects.  Patient will be engaged in psychotherapy as indicated.  Patient will report subjective improvement of symptoms.  Long term goals: To stabilize mood and treat/improve subjective symptoms, the patient will stay out of the hospital, the patient will be at an optimal level of  "functioning, and the patient will take all medications as prescribed.  The patient/guardian verbalized understanding and agreement with goals that were mutually set.    RISK ASSESSMENT  Current harm-to-self risk is reported by pt as \"none.\"  Current [id number] risk is reported by pt as \"none.\"   No suicidal thoughts, intent, plan is appreciated by this provider on this date of exam.   No homicidal thoughts, intent, plan is appreciated by this provider on this date.    TREATMENT PLAN: Continue supportive psychotherapy efforts and medications as indicated.   Pharmacological and Non-Pharmacological treatment options discussed during today's visit. Patient/Guardian acknowledged and verbally consented with current treatment plan and was educated on the importance of compliance with treatment and follow-up appointments.      MEDICATION ISSUES:  Discussed medication options and treatment plan of prescribed medication as well as the risks, benefits, any black box warnings, and side effects including potential falls, possible impaired driving, and metabolic adversities among others. Patient is agreeable to call the office with any worsening of symptoms or onset of side effects, or if any concerns or questions arise.  The contact information for the office is made available to the patient. Patient is agreeable to call 911 or go to the nearest ER should they begin having any SI/HI, or if any urgent concerns arise. No medication side effects or related complaints today.     Continue with adderall 20mg XR daily  Call office one week prior to needing refill  Continue with lexapro 10mg daily  Continue with Wellbutrin 300mgxl every morning, monitor b/p    MEDS ORDERED DURING VISIT:  New Medications Ordered This Visit   Medications    amphetamine-dextroamphetamine XR (Adderall XR) 20 MG 24 hr capsule     Sig: Take 1 capsule by mouth Every Morning     Dispense:  30 capsule     Refill:  0    buPROPion XL (Wellbutrin XL) 300 MG 24 " hr tablet     Sig: Take 1 tablet by mouth Every Morning.     Dispense:  90 tablet     Refill:  3       Follow Up Appointment:   Return in about 3 months (around 9/17/2024) for Recheck, Video visit.               This document has been electronically signed by KESHIA Balderas  June 5, 2024 08:15 EDT    Dictated Utilizing Dragon Dictation: Part of this note may be an electronic transcription/translation of spoken language to printed text using the Dragon Dictation System.

## 2024-06-05 NOTE — PATIENT INSTRUCTIONS
For concerns or needing assistance call the [facility] at [phone]  Continue with adderall 20mg XR daily  Call office one week prior to needing refill  Continue with lexapro 10mg daily  Continue with Wellbutrin 300mgxl every morning, monitor b/p

## 2024-06-19 ENCOUNTER — PRIOR AUTHORIZATION (OUTPATIENT)
Dept: PSYCHIATRY | Facility: CLINIC | Age: 39
End: 2024-06-19
Payer: COMMERCIAL

## 2024-07-13 RX ORDER — CEPHALEXIN 500 MG/1
500 CAPSULE ORAL 3 TIMES DAILY
Qty: 21 CAPSULE | Refills: 0 | Status: SHIPPED | OUTPATIENT
Start: 2024-07-13 | End: 2024-07-20

## 2024-08-06 RX ORDER — AMOXICILLIN AND CLAVULANATE POTASSIUM 875; 125 MG/1; MG/1
1 TABLET, FILM COATED ORAL 2 TIMES DAILY
Qty: 14 TABLET | Refills: 0 | Status: SHIPPED | OUTPATIENT
Start: 2024-08-06 | End: 2024-08-13

## 2024-08-08 DIAGNOSIS — F90.0 ADHD (ATTENTION DEFICIT HYPERACTIVITY DISORDER), INATTENTIVE TYPE: ICD-10-CM

## 2024-08-08 RX ORDER — DEXTROAMPHETAMINE SACCHARATE, AMPHETAMINE ASPARTATE MONOHYDRATE, DEXTROAMPHETAMINE SULFATE AND AMPHETAMINE SULFATE 5; 5; 5; 5 MG/1; MG/1; MG/1; MG/1
20 CAPSULE, EXTENDED RELEASE ORAL EVERY MORNING
Qty: 30 CAPSULE | Refills: 0 | Status: SHIPPED | OUTPATIENT
Start: 2024-08-08

## 2024-09-17 ENCOUNTER — TELEMEDICINE (OUTPATIENT)
Dept: PSYCHIATRY | Facility: CLINIC | Age: 39
End: 2024-09-17
Payer: COMMERCIAL

## 2024-09-17 DIAGNOSIS — F41.1 GENERALIZED ANXIETY DISORDER: ICD-10-CM

## 2024-09-17 DIAGNOSIS — F90.0 ADHD (ATTENTION DEFICIT HYPERACTIVITY DISORDER), INATTENTIVE TYPE: Primary | ICD-10-CM

## 2024-09-17 PROCEDURE — 99214 OFFICE O/P EST MOD 30 MIN: CPT | Performed by: NURSE PRACTITIONER

## 2024-09-17 RX ORDER — DEXTROAMPHETAMINE SACCHARATE, AMPHETAMINE ASPARTATE MONOHYDRATE, DEXTROAMPHETAMINE SULFATE AND AMPHETAMINE SULFATE 5; 5; 5; 5 MG/1; MG/1; MG/1; MG/1
20 CAPSULE, EXTENDED RELEASE ORAL EVERY MORNING
Qty: 30 CAPSULE | Refills: 0 | Status: SHIPPED | OUTPATIENT
Start: 2024-09-17 | End: 2024-09-19 | Stop reason: SDUPTHER

## 2024-09-19 DIAGNOSIS — F90.0 ADHD (ATTENTION DEFICIT HYPERACTIVITY DISORDER), INATTENTIVE TYPE: ICD-10-CM

## 2024-09-19 DIAGNOSIS — F41.1 GENERALIZED ANXIETY DISORDER: ICD-10-CM

## 2024-09-19 RX ORDER — BUPROPION HYDROCHLORIDE 300 MG/1
300 TABLET ORAL DAILY
Qty: 30 TABLET | Refills: 2 | OUTPATIENT
Start: 2024-09-19

## 2024-09-19 RX ORDER — DEXTROAMPHETAMINE SACCHARATE, AMPHETAMINE ASPARTATE MONOHYDRATE, DEXTROAMPHETAMINE SULFATE AND AMPHETAMINE SULFATE 5; 5; 5; 5 MG/1; MG/1; MG/1; MG/1
20 CAPSULE, EXTENDED RELEASE ORAL EVERY MORNING
Qty: 30 CAPSULE | Refills: 0 | Status: SHIPPED | OUTPATIENT
Start: 2024-09-19

## 2024-09-26 ENCOUNTER — HOSPITAL ENCOUNTER (OUTPATIENT)
Facility: HOSPITAL | Age: 39
Discharge: HOME | End: 2024-09-26
Admitting: OBSTETRICS & GYNECOLOGY
Payer: COMMERCIAL

## 2024-09-26 DIAGNOSIS — Z12.31 SCREENING MAMMOGRAM, ENCOUNTER FOR: ICD-10-CM

## 2024-09-26 PROCEDURE — 77067 SCR MAMMO BI INCL CAD: CPT

## 2024-09-26 PROCEDURE — 77063 BREAST TOMOSYNTHESIS BI: CPT

## 2024-10-10 ENCOUNTER — CLINICAL SUPPORT (OUTPATIENT)
Dept: OBSTETRICS AND GYNECOLOGY | Age: 39
End: 2024-10-10
Payer: COMMERCIAL

## 2024-10-10 PROCEDURE — 90471 IMMUNIZATION ADMIN: CPT | Performed by: OBSTETRICS & GYNECOLOGY

## 2024-10-10 PROCEDURE — 90656 IIV3 VACC NO PRSV 0.5 ML IM: CPT | Performed by: OBSTETRICS & GYNECOLOGY

## 2024-10-16 DIAGNOSIS — F90.0 ADHD (ATTENTION DEFICIT HYPERACTIVITY DISORDER), INATTENTIVE TYPE: ICD-10-CM

## 2024-10-16 RX ORDER — DEXTROAMPHETAMINE SACCHARATE, AMPHETAMINE ASPARTATE MONOHYDRATE, DEXTROAMPHETAMINE SULFATE AND AMPHETAMINE SULFATE 5; 5; 5; 5 MG/1; MG/1; MG/1; MG/1
20 CAPSULE, EXTENDED RELEASE ORAL EVERY MORNING
Qty: 30 CAPSULE | Refills: 0 | Status: SHIPPED | OUTPATIENT
Start: 2024-10-16

## 2024-12-18 DIAGNOSIS — F90.0 ADHD (ATTENTION DEFICIT HYPERACTIVITY DISORDER), INATTENTIVE TYPE: ICD-10-CM

## 2024-12-18 RX ORDER — DEXTROAMPHETAMINE SACCHARATE, AMPHETAMINE ASPARTATE MONOHYDRATE, DEXTROAMPHETAMINE SULFATE AND AMPHETAMINE SULFATE 5; 5; 5; 5 MG/1; MG/1; MG/1; MG/1
20 CAPSULE, EXTENDED RELEASE ORAL EVERY MORNING
Qty: 30 CAPSULE | Refills: 0 | Status: SHIPPED | OUTPATIENT
Start: 2024-12-18

## 2025-01-14 ENCOUNTER — TELEMEDICINE (OUTPATIENT)
Dept: PSYCHIATRY | Facility: CLINIC | Age: 40
End: 2025-01-14
Payer: COMMERCIAL

## 2025-01-14 DIAGNOSIS — Z79.899 ENCOUNTER FOR LONG-TERM (CURRENT) USE OF MEDICATIONS: ICD-10-CM

## 2025-01-14 DIAGNOSIS — F41.1 GENERALIZED ANXIETY DISORDER: ICD-10-CM

## 2025-01-14 DIAGNOSIS — F90.0 ADHD (ATTENTION DEFICIT HYPERACTIVITY DISORDER), INATTENTIVE TYPE: Primary | ICD-10-CM

## 2025-01-14 RX ORDER — ESCITALOPRAM OXALATE 10 MG/1
10 TABLET ORAL DAILY
Qty: 90 TABLET | Refills: 3 | Status: SHIPPED | OUTPATIENT
Start: 2025-01-14

## 2025-01-14 RX ORDER — DEXTROAMPHETAMINE SACCHARATE, AMPHETAMINE ASPARTATE MONOHYDRATE, DEXTROAMPHETAMINE SULFATE AND AMPHETAMINE SULFATE 5; 5; 5; 5 MG/1; MG/1; MG/1; MG/1
20 CAPSULE, EXTENDED RELEASE ORAL EVERY MORNING
Qty: 30 CAPSULE | Refills: 0 | Status: SHIPPED | OUTPATIENT
Start: 2025-01-14

## 2025-01-14 NOTE — PROGRESS NOTES
" Mode of Visit: Video  Location of patient: -WORK-  Location of provider: +HOME+  You have chosen to receive care through a telehealth visit.  The patient has signed the video visit consent form.  The visit included audio and video interaction. No technical issues occurred during this visit.  Patient verbally confirmed consent for today's encounter  01/14/2025      Subjective   Bren Lynch is a 39 y.o. 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  Patient reported above chief complaint.  States diagnosed with attention deficit disorder by pediatric psychology when she was in elementary school.  She was on medication but not sure what it was, possibly Ritalin.  She only took it for a couple years and then was not on medication but was an average student.  Struggled in math.  No history of hyperactivity.  No history of adverse childhood experiences, reports childhood was without trauma.  Patient reports as she is getting older she is finding it more difficult to stay on task, she gets easily distracted, difficulty with concentration and focus and follow through on her task.  This has affected her work quality.     Patient does report history of some anxiety and has been on Lexapro for a while and reports this has been helpful.  LUANNE-7 today is scored at 0, previously scored at 2.  PHQ-9 score  today at 1, previously at 2.  Patient reports she is back in her regular office, they are fully staffed currently at the clinic and this has helped relieve some of her stressors.  Continues with the Wellbutrin at 300 XL.  Reports mood overall is stable and she feels like the Wellbutrin also helps with focus and concentration.  Patient will be due for yearly drug screen in a few weeks and is agreeable to get this done.  " Request again for a serum drug screen as she reports their clinic does not do urine drug screens.  Also agreeable to renew controlled substance agreement and this was sent to the patient's my chart.     PHQ-9 Depression Screening  Little interest or pleasure in doing things? (Patient-Rptd) Not at all   Feeling down, depressed, or hopeless? (Patient-Rptd) Not at all   PHQ-2 Total Score (Patient-Rptd) 0   Trouble falling or staying asleep, or sleeping too much? (Patient-Rptd) Not at all   Feeling tired or having little energy? (Patient-Rptd) Not at all   Poor appetite or overeating? (Patient-Rptd) Not at all   Feeling bad about yourself - or that you are a failure or have let yourself or your family down? (Patient-Rptd) Not at all   Trouble concentrating on things, such as reading the newspaper or watching television? (Patient-Rptd) Several days   Moving or speaking so slowly that other people could have noticed? Or the opposite - being so fidgety or restless that you have been moving around a lot more than usual? (Patient-Rptd) Not at all   Thoughts that you would be better off dead, or of hurting yourself in some way? (Patient-Rptd) Not at all   PHQ-9 Total Score (Patient-Rptd) 1   If you checked off any problems, how difficult have these problems made it for you to do your work, take care of things at home, or get along with other people? (Patient-Rptd) Not difficult at all           LUANNE-7  Feeling nervous, anxious or on edge: (Patient-Rptd) Not at all  Not being able to stop or control worrying: (Patient-Rptd) Not at all  Worrying too much about different things: (Patient-Rptd) Not at all  Trouble Relaxing: (Patient-Rptd) Not at all  Being so restless that it is hard to sit still: (Patient-Rptd) Not at all  Feeling afraid as if something awful might happen: (Patient-Rptd) Not at all  Becoming easily annoyed or irritable: (Patient-Rptd) Not at all  LUANNE 7 Total Score: (Patient-Rptd) 0  If you checked any problems,  how difficult have these problems made it for you to do your work, take care of things at home, or get along with other people: (Patient-Rptd) Not difficult at all    Last Menstrual Period:  Mirena IUD, not currently having menses.    The following portions of the patient's history were reviewed and updated as appropriate: allergies, current medications, past family history, past medical history, past social history, past surgical history and problem list.    Past Psychiatric History:  Began Treatment: as a child in elementary school  Diagnoses:Anxiety and ADHD  Psychiatrist: saw provider at pediatric psychology, Dr. Al Diaz  Therapist: therapy when getting   Admission History:Denies  Medication Trials: unsure, thinks ritalin; zoloft gave somnolence. Has been on Wellbutrin for about 6 mos, currently at 300mg XL daily as of 02/15/2024. For ADHD. Not effective but has had some benefit for mood. Lexapro for some time for anxiety.   Self Harm: Denies  Suicide Attempts:Denies   Psychosis, Anxiety, Depression: Denies  Denies any history of a head injury or seizure.  Denies any history of psychosis or hallucinations.    Past Medical History:  Past Medical History:   Diagnosis Date    ADHD (attention deficit hyperactivity disorder)     I was diagnosed in 2nd grade    Anxiety     Depression        Substance Abuse History:   Types:Denies all, including illicit        Social History:  Social History     Socioeconomic History    Marital status:     Number of children: 2    Years of education: 12    Highest education level: Some college, no degree   Tobacco Use    Smoking status: Never     Passive exposure: Never    Smokeless tobacco: Never   Vaping Use    Vaping status: Never Used   Substance and Sexual Activity    Alcohol use: No    Drug use: No    Sexual activity: Not Currently     Partners: Male     Birth control/protection: I.U.D.     Comment: Mirena 2017   Patient reports support system  consists of her sister and her father.  Patient has been  and  one time.  Hinduism preference is Adventist, denied any history of legal problems.  Denied any history of  service.    Family History:  Family History   Problem Relation Age of Onset    Breast cancer Mother 58    Hypertension Mother     Hyperlipidemia Mother     Other Mother         BLOOD CLOTS    Liver cancer Mother 65    Bone cancer Mother 65    Anxiety disorder Mother     Hypertension Father     Hyperlipidemia Father     Lung cancer Maternal Uncle 56    Brain cancer Maternal Uncle 56    Bone cancer Maternal Uncle 56    Stroke Maternal Grandfather     Lung cancer Maternal Grandfather     Emphysema Maternal Grandfather     Osteoporosis Maternal Grandmother     Dementia Maternal Grandmother     Stroke Paternal Grandfather     Prostate cancer Paternal Grandfather     Uterine cancer Paternal Grandmother 60    Cleft palate Cousin     ADD / ADHD Cousin     Mental retardation Cousin     Von Willebrand disease Cousin     Anxiety disorder Son     Ovarian cancer Neg Hx     Colon cancer Neg Hx     Melanoma Neg Hx        Past Surgical History:  Past Surgical History:   Procedure Laterality Date    ABDOMINAL SURGERY      C-SEC     SECTION       SECTION      TONSILLECTOMY  1986       Problem List:  Patient Active Problem List   Diagnosis    IUD (intrauterine device) in place    Family history of breast cancer       Allergy:   No Known Allergies     Current Medications:   Current Outpatient Medications   Medication Sig Dispense Refill    amphetamine-dextroamphetamine XR (Adderall XR) 20 MG 24 hr capsule Take 1 capsule by mouth Every Morning 30 capsule 0    buPROPion XL (Wellbutrin XL) 300 MG 24 hr tablet Take 1 tablet by mouth Every Morning. 90 tablet 3    cetirizine (zyrTEC) 10 MG tablet Take 1 tablet by mouth Daily.      escitalopram (LEXAPRO) 10 MG tablet Take 1 tablet by mouth Daily. 90 tablet 3     Levonorgestrel (Mirena, 52 MG,) 20 MCG/DAY intrauterine device IUD To be inserted one time by prescriber. Route intrauterine. 1 each 0     No current facility-administered medications for this visit.       Review of Systems:    Review of Systems   Psychiatric/Behavioral:  Positive for decreased concentration.    All other systems reviewed and are negative.        Physical Exam:   Physical Exam  Vitals reviewed.   Constitutional:       Appearance: Normal appearance. She is well-developed and well-groomed.   HENT:      Head: Normocephalic.   Neurological:      Mental Status: She is alert.   Psychiatric:         Attention and Perception: Attention and perception normal.         Mood and Affect: Mood and affect normal.         Speech: Speech normal.         Behavior: Behavior normal. Behavior is cooperative.         Thought Content: Thought content normal.         Cognition and Memory: Cognition normal.         Judgment: Judgment normal.         Vitals:  not currently breastfeeding. There is no height or weight on file to calculate BMI.  Due to extenuating circumstances and possible current health risks associated with the patient being present in a clinical setting (with current health restrictions in place in regards to possible COVID 19 transmission/exposure), the patient was seen remotely today via a MyChart Video Visit through [facility] and telephone encounter.  Unable to obtain vital signs due to nature of remote visit.  Height stated at 66 inches.  Weight stated at 180 pounds.    Last 3 Blood Pressure Readings:  BP Readings from Last 3 Encounters:   02/26/24 124/76   01/17/24 120/70   09/07/23 118/68          Mental Status Exam:   Hygiene:   good  Cooperation:  Cooperative  Eye Contact:  Good  Psychomotor Behavior:  Appropriate  Affect:  Full range  Mood: euthymic  Hopelessness: Denies  Speech:  Normal  Thought Process:  Goal directed and Linear  Thought Content:  Normal  Suicidal:  None  Homicidal:   "None  Hallucinations:  None  Delusion:  None  Memory:   Reports can be forgetful  Orientation:  Person, Place, Time, and Situation  Reliability:  good  Insight:  Good  Judgement:  Good  Impulse Control:  Good  Physical/Medical Issues:  No        Lab Results:   No new labs           Assessment & Plan   Diagnoses and all orders for this visit:    1. ADHD (attention deficit hyperactivity disorder), inattentive type (Primary)  -     amphetamine-dextroamphetamine XR (Adderall XR) 20 MG 24 hr capsule; Take 1 capsule by mouth Every Morning  Dispense: 30 capsule; Refill: 0    2. Generalized anxiety disorder  -     escitalopram (LEXAPRO) 10 MG tablet; Take 1 tablet by mouth Daily.  Dispense: 90 tablet; Refill: 3    3. Encounter for long-term (current) use of medications  -     Drug Screen (10), Serum; Future        Visit Diagnoses:    ICD-10-CM ICD-9-CM   1. ADHD (attention deficit hyperactivity disorder), inattentive type  F90.0 314.00   2. Generalized anxiety disorder  F41.1 300.02   3. Encounter for long-term (current) use of medications  Z79.899 V58.69         GOALS:  Short Term Goals: Patient will be compliant with medication, and patient will have no significant medication related side effects.  Patient will be engaged in psychotherapy as indicated.  Patient will report subjective improvement of symptoms.  Long term goals: To stabilize mood and treat/improve subjective symptoms, the patient will stay out of the hospital, the patient will be at an optimal level of functioning, and the patient will take all medications as prescribed.  The patient/guardian verbalized understanding and agreement with goals that were mutually set.    RISK ASSESSMENT  Current harm-to-self risk is reported by pt as \"none.\"  Current aozm-hp-[id number] risk is reported by pt as \"none.\"   No suicidal thoughts, intent, plan is appreciated by this provider on this date of exam.   No homicidal thoughts, intent, plan is appreciated by this provider on " this date.    TREATMENT PLAN: Continue supportive psychotherapy efforts and medications as indicated.   Pharmacological and Non-Pharmacological treatment options discussed during today's visit. Patient/Guardian acknowledged and verbally consented with current treatment plan and was educated on the importance of compliance with treatment and follow-up appointments.      MEDICATION ISSUES:  Discussed medication options and treatment plan of prescribed medication as well as the risks, benefits, any black box warnings, and side effects including potential falls, possible impaired driving, and metabolic adversities among others. Patient is agreeable to call the office with any worsening of symptoms or onset of side effects, or if any concerns or questions arise.  The contact information for the office is made available to the patient. Patient is agreeable to call 911 or go to the nearest ER should they begin having any SI/HI, or if any urgent concerns arise. No medication side effects or related complaints today.     Continue with adderall 20mg XR daily  Call office one week prior to needing refill  Continue with lexapro 10mg daily  Continue with Wellbutrin 300mgxl every morning, monitor b/p  Serum drug screen ordered  [place] substance agreement renewed    MEDS ORDERED DURING VISIT:  New Medications Ordered This Visit   Medications    amphetamine-dextroamphetamine XR (Adderall XR) 20 MG 24 hr capsule     Sig: Take 1 capsule by mouth Every Morning     Dispense:  30 capsule     Refill:  0    escitalopram (LEXAPRO) 10 MG tablet     Sig: Take 1 tablet by mouth Daily.     Dispense:  90 tablet     Refill:  3       Follow Up Appointment:   Return in about 3 months (around 4/21/2025) for Recheck, Video visit.               This document has been electronically signed by KESHIA Balderas  January 14, 2025 08:14 EST    Dictated Utilizing Dragon Dictation: Part of this note may be an electronic transcription/translation of  spoken language to printed text using the Dragon Dictation System.

## 2025-01-14 NOTE — PATIENT INSTRUCTIONS
For concerns or needing assistance call the [facility] at [phone]  Serum Drug Screen ordered  Controlled substance agreement renewed  Continue with current medications as ordered  Medication refills:- Refill requests are addressed M-Th. No refills will be sent in on Fridays, weekends or federal holidays.   Please be aware of your need for refills and call one week before needing medication refilled so it can be filled in a timely manner.

## 2025-01-30 ENCOUNTER — LAB (OUTPATIENT)
Dept: OBSTETRICS AND GYNECOLOGY | Age: 40
End: 2025-01-30
Payer: COMMERCIAL

## 2025-01-30 DIAGNOSIS — Z79.899 ENCOUNTER FOR LONG-TERM (CURRENT) USE OF MEDICATIONS: ICD-10-CM

## 2025-02-26 ENCOUNTER — OFFICE VISIT (OUTPATIENT)
Dept: FAMILY MEDICINE CLINIC | Facility: CLINIC | Age: 40
End: 2025-02-26
Payer: COMMERCIAL

## 2025-02-26 VITALS
BODY MASS INDEX: 28.87 KG/M2 | WEIGHT: 179.6 LBS | SYSTOLIC BLOOD PRESSURE: 124 MMHG | HEIGHT: 66 IN | TEMPERATURE: 98.3 F | OXYGEN SATURATION: 98 % | HEART RATE: 72 BPM | DIASTOLIC BLOOD PRESSURE: 74 MMHG

## 2025-02-26 DIAGNOSIS — E78.49 OTHER HYPERLIPIDEMIA: ICD-10-CM

## 2025-02-26 DIAGNOSIS — E66.3 OVERWEIGHT (BMI 25.0-29.9): ICD-10-CM

## 2025-02-26 DIAGNOSIS — Z00.01 ENCOUNTER FOR ANNUAL GENERAL MEDICAL EXAMINATION WITH ABNORMAL FINDINGS IN ADULT: Primary | ICD-10-CM

## 2025-02-26 PROCEDURE — 99396 PREV VISIT EST AGE 40-64: CPT | Performed by: FAMILY MEDICINE

## 2025-02-26 NOTE — PROGRESS NOTES
Subjective   Bren Lynch is a 40 y.o. female who presents for annual female wellness exam.  Chief Complaint   Patient presents with    Annual Exam     No pap         History of Present Illness  The patient is a 40-year-old female who presents for an annual exam.    She has been actively participating in telehealth sessions with her psychiatrist, which she reports as beneficial. She is not misusing her prescribed medications. The treatment regimen for ADD and anxiety appears to be effective, as she has experienced an improvement in her ability to concentrate at work.    She has experienced a decrease in appetite and a significant weight loss. She is currently in the overweight category, having transitioned from the obesity range. Her current BMI is 29. She is not fasting today.    She reports experiencing headaches, which she attributes to weather changes. She has recently purchased Flonase to manage these symptoms.    MEDICATIONS  Flonase    IMMUNIZATIONS  She has received her influenza vaccine.       Menstrual History: none  Pregnancy History: G2  Sexual History: not now   Contraception: IUD  Hormone Replacement Therapy: na  Diet: standard  Exercise: yes, walking and elliptical  Last dental exam: up to date  Last eye exam: up to date    Mammogram: 9/26/2024  Pap Smear: per gyn  Bone Density: 1/17/2024 neg cotest  Colon Cancer Screening: na    Immunization History   Administered Date(s) Administered    COVID-19 (MODERNA) 1st,2nd,3rd Dose Monovalent 12/31/2021, 01/28/2022    Flu Vaccine Intradermal Quad 18-64YR 10/16/2019    Flublok 18+yrs 10/28/2019    Fluzone  >6mos 10/10/2024    Fluzone (or Fluarix & Flulaval for VFC) >6mos 10/05/2022, 09/20/2023    Hpv9 06/01/2022, 08/01/2022, 12/01/2022    Influenza, Unspecified 10/05/2022    Tdap 01/02/2020, 06/23/2024       The following portions of the patient's history were reviewed and updated as appropriate: allergies, current medications, past family history, past  medical history, past social history, past surgical history and problem list.    Past Medical History:   Diagnosis Date    ADHD (attention deficit hyperactivity disorder)     I was diagnosed in 2nd grade    Anxiety     Depression        Past Surgical History:   Procedure Laterality Date    ABDOMINAL SURGERY      C-SEC     SECTION       SECTION      TONSILLECTOMY  1986       Family History   Problem Relation Age of Onset    Breast cancer Mother 58    Hypertension Mother     Hyperlipidemia Mother     Liver cancer Mother 65    Bone cancer Mother 65    Anxiety disorder Mother     Hypertension Father     Hyperlipidemia Father     Lung cancer Maternal Uncle 56    Brain cancer Maternal Uncle 56    Bone cancer Maternal Uncle 56    Stroke Maternal Grandfather     Lung cancer Maternal Grandfather     Emphysema Maternal Grandfather     Osteoporosis Maternal Grandmother     Dementia Maternal Grandmother     Stroke Paternal Grandfather     Prostate cancer Paternal Grandfather     Uterine cancer Paternal Grandmother 60    Cleft palate Cousin     ADD / ADHD Cousin     Mental retardation Cousin     Von Willebrand disease Cousin     Anxiety disorder Son     Ovarian cancer Neg Hx     Colon cancer Neg Hx     Melanoma Neg Hx        Social History     Socioeconomic History    Marital status:     Number of children: 2    Years of education: 12    Highest education level: Some college, no degree   Tobacco Use    Smoking status: Never     Passive exposure: Never    Smokeless tobacco: Never   Vaping Use    Vaping status: Never Used   Substance and Sexual Activity    Alcohol use: No    Drug use: No    Sexual activity: Not Currently     Partners: Male     Birth control/protection: I.U.D.     Comment: Mirena 2017         Current Outpatient Medications:     amphetamine-dextroamphetamine XR (Adderall XR) 20 MG 24 hr capsule, Take 1 capsule by mouth Every Morning, Disp: 30 capsule, Rfl: 0     "buPROPion XL (Wellbutrin XL) 300 MG 24 hr tablet, Take 1 tablet by mouth Every Morning., Disp: 90 tablet, Rfl: 3    cetirizine (zyrTEC) 10 MG tablet, Take 1 tablet by mouth Daily., Disp: , Rfl:     escitalopram (LEXAPRO) 10 MG tablet, Take 1 tablet by mouth Daily., Disp: 90 tablet, Rfl: 3    Levonorgestrel (Mirena, 52 MG,) 20 MCG/DAY intrauterine device IUD, To be inserted one time by prescriber. Route intrauterine., Disp: 1 each, Rfl: 0    Review of Systems    Objective   Vitals:    02/26/25 1452   BP: 124/74   Pulse: 72   Temp: 98.3 °F (36.8 °C)   SpO2: 98%   Weight: 81.5 kg (179 lb 9.6 oz)   Height: 167.6 cm (66\")     Body mass index is 28.99 kg/m².  Physical Exam  Vitals and nursing note reviewed.   Constitutional:       General: She is not in acute distress.     Appearance: Normal appearance. She is well-developed.   HENT:      Head: Normocephalic and atraumatic.      Right Ear: Tympanic membrane, ear canal and external ear normal.      Left Ear: Tympanic membrane, ear canal and external ear normal.      Nose: Nose normal.      Mouth/Throat:      Mouth: Mucous membranes are moist.      Pharynx: Oropharynx is clear. No oropharyngeal exudate or posterior oropharyngeal erythema.   Eyes:      Conjunctiva/sclera: Conjunctivae normal.      Pupils: Pupils are equal, round, and reactive to light.   Neck:      Thyroid: No thyromegaly.   Cardiovascular:      Rate and Rhythm: Normal rate and regular rhythm.      Heart sounds: No murmur heard.  Pulmonary:      Effort: Pulmonary effort is normal.      Breath sounds: Normal breath sounds. No wheezing.   Abdominal:      General: Abdomen is flat. Bowel sounds are normal. There is no distension.      Palpations: Abdomen is soft. There is no mass.      Tenderness: There is no abdominal tenderness.      Hernia: No hernia is present.   Musculoskeletal:         General: No swelling. Normal range of motion.      Cervical back: Normal range of motion and neck supple.      Right lower " leg: No edema.      Left lower leg: No edema.   Lymphadenopathy:      Cervical: No cervical adenopathy.   Skin:     General: Skin is warm and dry.      Capillary Refill: Capillary refill takes less than 2 seconds.      Findings: No rash.   Neurological:      General: No focal deficit present.      Mental Status: She is alert and oriented to person, place, and time.      Cranial Nerves: No cranial nerve deficit.   Psychiatric:         Mood and Affect: Mood normal.         Behavior: Behavior normal.       Physical Exam  Vital Signs  Blood pressure is 124/74. Height is 5 feet 6 inches.       Results         Assessment & Plan   Diagnoses and all orders for this visit:    1. Encounter for annual general medical examination with abnormal findings in adult (Primary)  -     TSH  -     Lipid Panel  -     CBC & Differential  -     Comprehensive Metabolic Panel    2. Overweight (BMI 25.0-29.9)  -     TSH  -     Lipid Panel  -     CBC & Differential  -     Comprehensive Metabolic Panel    3. Other hyperlipidemia  -     TSH  -     Lipid Panel  -     CBC & Differential  -     Comprehensive Metabolic Panel      Assessment & Plan  1. Annual examination.  Her blood pressure is well-regulated at 124/74. She has successfully transitioned from the obesity range to the overweight category, with a current BMI of 29. Her cholesterol levels have shown significant improvement compared to the previous year. She has no care gaps except for the COVID-19 vaccine, which is not available at this location. She has already received her influenza vaccine. A comprehensive set of laboratory tests, including CBC, thyroid, lipid panel, and chemistry, will be ordered for her annual check-up and to monitor her hyperlipidemia.    2. Attention deficit disorder.  She reports that her current treatment for ADD is helping her focus on work. She is advised to continue her current medication regimen and follow up with her psychiatrist as needed.    3.  Anxiety.  She reports that her current treatment for anxiety is effective. She is advised to continue her current medication regimen and follow up with her psychiatrist as needed.    4. Sinus headaches.  She has been advised to use Flonase for a period of 2 weeks to alleviate her sinus headaches.    5. Hyperlipidemia.  Her cholesterol levels have improved compared to the previous year. A lipid panel will be included in her lab tests to recheck her hyperlipidemia.            Discussed the importance of maintaining a healthy weight and getting regular exercise.  Educated patient on the benefits of healthy diet.  Advise follow-up annually for wellness exams.    There are no Patient Instructions on file for this visit.    Patient or patient representative verbalized consent for the use of Ambient Listening during the visit with  Meredith Lea Kehrer, MD for chart documentation. 2/26/2025  15:10 EST

## 2025-02-27 DIAGNOSIS — E66.3 OVERWEIGHT (BMI 25.0-29.9): ICD-10-CM

## 2025-02-27 DIAGNOSIS — E78.49 OTHER HYPERLIPIDEMIA: ICD-10-CM

## 2025-02-27 DIAGNOSIS — Z00.01 ENCOUNTER FOR ANNUAL GENERAL MEDICAL EXAMINATION WITH ABNORMAL FINDINGS IN ADULT: ICD-10-CM

## 2025-02-28 LAB
ALBUMIN SERPL-MCNC: 4 G/DL (ref 3.5–5.2)
ALBUMIN/GLOB SERPL: 1.5 G/DL
ALP SERPL-CCNC: 66 U/L (ref 39–117)
ALT SERPL-CCNC: 16 U/L (ref 1–33)
AST SERPL-CCNC: 14 U/L (ref 1–32)
BASOPHILS # BLD AUTO: 0.03 10*3/MM3 (ref 0–0.2)
BASOPHILS NFR BLD AUTO: 0.6 % (ref 0–1.5)
BILIRUB SERPL-MCNC: 0.5 MG/DL (ref 0–1.2)
BUN SERPL-MCNC: 12 MG/DL (ref 6–20)
BUN/CREAT SERPL: 11.8 (ref 7–25)
CALCIUM SERPL-MCNC: 9.2 MG/DL (ref 8.6–10.5)
CHLORIDE SERPL-SCNC: 104 MMOL/L (ref 98–107)
CHOLEST SERPL-MCNC: 183 MG/DL (ref 0–200)
CO2 SERPL-SCNC: 25.2 MMOL/L (ref 22–29)
CREAT SERPL-MCNC: 1.02 MG/DL (ref 0.57–1)
EGFRCR SERPLBLD CKD-EPI 2021: 71 ML/MIN/1.73
EOSINOPHIL # BLD AUTO: 0.06 10*3/MM3 (ref 0–0.4)
EOSINOPHIL NFR BLD AUTO: 1.1 % (ref 0.3–6.2)
ERYTHROCYTE [DISTWIDTH] IN BLOOD BY AUTOMATED COUNT: 11.6 % (ref 12.3–15.4)
GLOBULIN SER CALC-MCNC: 2.6 GM/DL
GLUCOSE SERPL-MCNC: 82 MG/DL (ref 65–99)
HCT VFR BLD AUTO: 43.2 % (ref 34–46.6)
HDLC SERPL-MCNC: 43 MG/DL (ref 40–60)
HGB BLD-MCNC: 14.2 G/DL (ref 12–15.9)
IMM GRANULOCYTES # BLD AUTO: 0.01 10*3/MM3 (ref 0–0.05)
IMM GRANULOCYTES NFR BLD AUTO: 0.2 % (ref 0–0.5)
LDLC SERPL CALC-MCNC: 125 MG/DL (ref 0–100)
LYMPHOCYTES # BLD AUTO: 1.67 10*3/MM3 (ref 0.7–3.1)
LYMPHOCYTES NFR BLD AUTO: 31.2 % (ref 19.6–45.3)
MCH RBC QN AUTO: 30.2 PG (ref 26.6–33)
MCHC RBC AUTO-ENTMCNC: 32.9 G/DL (ref 31.5–35.7)
MCV RBC AUTO: 91.9 FL (ref 79–97)
MONOCYTES # BLD AUTO: 0.5 10*3/MM3 (ref 0.1–0.9)
MONOCYTES NFR BLD AUTO: 9.3 % (ref 5–12)
NEUTROPHILS # BLD AUTO: 3.08 10*3/MM3 (ref 1.7–7)
NEUTROPHILS NFR BLD AUTO: 57.6 % (ref 42.7–76)
NRBC BLD AUTO-RTO: 0 /100 WBC (ref 0–0.2)
PLATELET # BLD AUTO: 227 10*3/MM3 (ref 140–450)
POTASSIUM SERPL-SCNC: 4.9 MMOL/L (ref 3.5–5.2)
PROT SERPL-MCNC: 6.6 G/DL (ref 6–8.5)
RBC # BLD AUTO: 4.7 10*6/MM3 (ref 3.77–5.28)
SODIUM SERPL-SCNC: 138 MMOL/L (ref 136–145)
TRIGL SERPL-MCNC: 82 MG/DL (ref 0–150)
TSH SERPL DL<=0.005 MIU/L-ACNC: 1.63 UIU/ML (ref 0.27–4.2)
VLDLC SERPL CALC-MCNC: 15 MG/DL (ref 5–40)
WBC # BLD AUTO: 5.35 10*3/MM3 (ref 3.4–10.8)

## 2025-03-10 DIAGNOSIS — J01.00 ACUTE NON-RECURRENT MAXILLARY SINUSITIS: Primary | ICD-10-CM

## 2025-03-10 RX ORDER — AZITHROMYCIN 250 MG/1
250 TABLET, FILM COATED ORAL DAILY
Qty: 6 TABLET | Refills: 0 | Status: SHIPPED | OUTPATIENT
Start: 2025-03-10

## 2025-04-02 DIAGNOSIS — F90.0 ADHD (ATTENTION DEFICIT HYPERACTIVITY DISORDER), INATTENTIVE TYPE: ICD-10-CM

## 2025-04-02 RX ORDER — DEXTROAMPHETAMINE SACCHARATE, AMPHETAMINE ASPARTATE MONOHYDRATE, DEXTROAMPHETAMINE SULFATE AND AMPHETAMINE SULFATE 5; 5; 5; 5 MG/1; MG/1; MG/1; MG/1
20 CAPSULE, EXTENDED RELEASE ORAL EVERY MORNING
Qty: 30 CAPSULE | Refills: 0 | OUTPATIENT
Start: 2025-04-02

## 2025-04-03 ENCOUNTER — TELEPHONE (OUTPATIENT)
Dept: PSYCHIATRY | Facility: CLINIC | Age: 40
End: 2025-04-03
Payer: COMMERCIAL

## 2025-04-03 DIAGNOSIS — F90.0 ADHD (ATTENTION DEFICIT HYPERACTIVITY DISORDER), INATTENTIVE TYPE: ICD-10-CM

## 2025-04-03 RX ORDER — DEXTROAMPHETAMINE SACCHARATE, AMPHETAMINE ASPARTATE MONOHYDRATE, DEXTROAMPHETAMINE SULFATE AND AMPHETAMINE SULFATE 5; 5; 5; 5 MG/1; MG/1; MG/1; MG/1
20 CAPSULE, EXTENDED RELEASE ORAL EVERY MORNING
Qty: 30 CAPSULE | Refills: 0 | Status: SHIPPED | OUTPATIENT
Start: 2025-04-03

## 2025-05-15 ENCOUNTER — TELEMEDICINE (OUTPATIENT)
Dept: PSYCHIATRY | Facility: CLINIC | Age: 40
End: 2025-05-15
Payer: COMMERCIAL

## 2025-05-15 DIAGNOSIS — F41.1 GENERALIZED ANXIETY DISORDER: ICD-10-CM

## 2025-05-15 DIAGNOSIS — F90.0 ADHD (ATTENTION DEFICIT HYPERACTIVITY DISORDER), INATTENTIVE TYPE: Primary | ICD-10-CM

## 2025-05-15 RX ORDER — BUPROPION HYDROCHLORIDE 300 MG/1
300 TABLET ORAL EVERY MORNING
Qty: 90 TABLET | Refills: 3 | Status: SHIPPED | OUTPATIENT
Start: 2025-05-15

## 2025-05-15 RX ORDER — DEXTROAMPHETAMINE SACCHARATE, AMPHETAMINE ASPARTATE MONOHYDRATE, DEXTROAMPHETAMINE SULFATE AND AMPHETAMINE SULFATE 6.25; 6.25; 6.25; 6.25 MG/1; MG/1; MG/1; MG/1
25 CAPSULE, EXTENDED RELEASE ORAL DAILY
Qty: 30 CAPSULE | Refills: 0 | Status: SHIPPED | OUTPATIENT
Start: 2025-05-15 | End: 2026-05-15

## 2025-05-15 NOTE — PATIENT INSTRUCTIONS
For concerns or needing assistance call the [facility] at [phone]  Controlled substance agreement sent again to my chart  Increase adderall xr to 25mg daily  Continue with Wellubtrin 300mg XL daily  Continue with Lexapro 10 mg daily

## 2025-05-15 NOTE — PROGRESS NOTES
" Mode of Visit: Video  Location of patient: -WORK-  Location of provider: +HOME+  You have chosen to receive care through a telehealth visit.  The patient has signed the video visit consent form.  The visit included audio and video interaction. No technical issues occurred during this visit.  Patient verbally confirmed consent for today's encounter  05/15/2025      Subjective   Bren Lynch is a 40 y.o. 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.  Patient reported above chief complaint.  States diagnosed with attention deficit disorder by pediatric psychology when she was in elementary school.  She was on medication but not sure what it was, possibly Ritalin.  She only took it for a couple years and then was not on medication but was an average student.  Struggled in math.  No history of hyperactivity.  No history of adverse childhood experiences, reports childhood was without trauma.  Patient reports as she is getting older she is finding it more difficult to stay on task, she gets easily distracted, difficulty with concentration and focus and follow through on her task.  This has affected her work quality.     Patient does report history of some anxiety and has been on Lexapro for a while and reports this has been helpful.  LUANNE-7 today is scored at 0, previously scored at 0.  PHQ-9 score  today at 1, previously at 1.    Patient reports concentration still could be better.  She can tell that the medication is helping, currently on Adderall XR 20 mg.  She still having to write down a lot of notes, her desk is where there is a lot of distractions and there is nowhere else for her to go.  We discussed increasing to 25 mg and patient was agreeable.     Continues with the Wellbutrin at 300 XL.  Reports mood " overall is stable and she feels like the Wellbutrin also helps with focus and concentration.  Patient had drug screen and medication was not in her system.  She reports she had not taken it for a few days prior to getting her blood drawn for the serum drug screen.     PHQ-9 Depression Screening  Little interest or pleasure in doing things? (Patient-Rptd) Not at all   Feeling down, depressed, or hopeless? (Patient-Rptd) Not at all   PHQ-2 Total Score (Patient-Rptd) 0   Trouble falling or staying asleep, or sleeping too much? (Patient-Rptd) Not at all   Feeling tired or having little energy? (Patient-Rptd) Not at all   Poor appetite or overeating? (Patient-Rptd) Not at all   Feeling bad about yourself - or that you are a failure or have let yourself or your family down? (Patient-Rptd) Not at all   Trouble concentrating on things, such as reading the newspaper or watching television? (Patient-Rptd) Several days   Moving or speaking so slowly that other people could have noticed? Or the opposite - being so fidgety or restless that you have been moving around a lot more than usual? (Patient-Rptd) Not at all   Thoughts that you would be better off dead, or of hurting yourself in some way? (Patient-Rptd) Not at all   PHQ-9 Total Score (Patient-Rptd) 1   If you checked off any problems, how difficult have these problems made it for you to do your work, take care of things at home, or get along with other people? (Patient-Rptd) Somewhat difficult           LUANNE-7  Feeling nervous, anxious or on edge: (Patient-Rptd) Not at all  Not being able to stop or control worrying: (Patient-Rptd) Not at all  Worrying too much about different things: (Patient-Rptd) Not at all  Trouble Relaxing: (Patient-Rptd) Not at all  Being so restless that it is hard to sit still: (Patient-Rptd) Not at all  Feeling afraid as if something awful might happen: (Patient-Rptd) Not at all  Becoming easily annoyed or irritable: (Patient-Rptd) Not at all  LUANNE  7 Total Score: (Patient-Rptd) 0  If you checked any problems, how difficult have these problems made it for you to do your work, take care of things at home, or get along with other people: (Patient-Rptd) Not difficult at all    Last Menstrual Period:  Mirena IUD, not currently having menses.    The following portions of the patient's history were reviewed and updated as appropriate: allergies, current medications, past family history, past medical history, past social history, past surgical history and problem list.    Past Psychiatric History:  Began Treatment: as a child in elementary school  Diagnoses:Anxiety and ADHD  Psychiatrist: saw provider at pediatric psychology, Dr. Al Diaz  Therapist: therapy when getting   Admission History:Denies  Medication Trials: unsure, thinks ritalin; zoloft gave somnolence. Has been on Wellbutrin for about 6 mos, currently at 300mg XL daily as of 02/15/2024. For ADHD. Not effective but has had some benefit for mood. Lexapro for some time for anxiety.   Self Harm: Denies  Suicide Attempts:Denies   Psychosis, Anxiety, Depression: Denies  Denies any history of a head injury or seizure.  Denies any history of psychosis or hallucinations.    Past Medical History:  Past Medical History:   Diagnosis Date    ADHD (attention deficit hyperactivity disorder)     I was diagnosed in 2nd grade    Anxiety     Anxiety/Stress    Depression        Substance Abuse History:   Types:Denies all, including illicit        Social History:  Social History     Socioeconomic History    Marital status:     Number of children: 2    Years of education: 12    Highest education level: Some college, no degree   Tobacco Use    Smoking status: Never     Passive exposure: Never    Smokeless tobacco: Never   Vaping Use    Vaping status: Never Used   Substance and Sexual Activity    Alcohol use: No    Drug use: No    Sexual activity: Not Currently     Partners: Male     Birth  control/protection: I.U.D.     Comment: Mirena 2017   Patient reports support system consists of her sister and her father.  Patient has been  and  one time.  Adventism preference is [employer], denied any history of legal problems.  Denied any history of  service.    Family History:  Family History   Problem Relation Age of Onset    Breast cancer Mother 58    Hypertension Mother     Hyperlipidemia Mother     Liver cancer Mother 65    Bone cancer Mother 65    Anxiety disorder Mother     Hypertension Father     Hyperlipidemia Father     Lung cancer Maternal Uncle 56    Brain cancer Maternal Uncle 56    Bone cancer Maternal Uncle 56    Stroke Maternal Grandfather     Lung cancer Maternal Grandfather     Emphysema Maternal Grandfather     Osteoporosis Maternal Grandmother     Dementia Maternal Grandmother     Stroke Paternal Grandfather     Prostate cancer Paternal Grandfather     Uterine cancer Paternal Grandmother 60    Cleft palate Cousin     ADD / ADHD Cousin     Mental retardation Cousin     Von Willebrand disease Cousin     Anxiety disorder Son     Ovarian cancer Neg Hx     Colon cancer Neg Hx     Melanoma Neg Hx        Past Surgical History:  Past Surgical History:   Procedure Laterality Date    ABDOMINAL SURGERY      C-SEC     SECTION       SECTION      TONSILLECTOMY  1986       Problem List:  Patient Active Problem List   Diagnosis    IUD (intrauterine device) in place    Family history of breast cancer       Allergy:   No Known Allergies     Current Medications:   Current Outpatient Medications   Medication Sig Dispense Refill    buPROPion XL (Wellbutrin XL) 300 MG 24 hr tablet Take 1 tablet by mouth Every Morning. 90 tablet 3    cetirizine (zyrTEC) 10 MG tablet Take 1 tablet by mouth Daily.      escitalopram (LEXAPRO) 10 MG tablet Take 1 tablet by mouth Daily. 90 tablet 3    Levonorgestrel (Mirena, 52 MG,) 20 MCG/DAY intrauterine device IUD To be  inserted one time by prescriber. Route intrauterine. 1 each 0    amphetamine-dextroamphetamine XR (ADDERALL XR) 25 MG 24 hr capsule Take 1 capsule by mouth Daily 30 capsule 0     No current facility-administered medications for this visit.       Review of Systems:    Review of Systems   Psychiatric/Behavioral:  Positive for decreased concentration.    All other systems reviewed and are negative.        Physical Exam:   Physical Exam  Vitals reviewed.   Constitutional:       Appearance: Normal appearance. She is well-developed and well-groomed.   HENT:      Head: Normocephalic.   Neurological:      Mental Status: She is alert.   Psychiatric:         Attention and Perception: Attention and perception normal.         Mood and Affect: Mood and affect normal.         Speech: Speech normal.         Behavior: Behavior normal. Behavior is cooperative.         Thought Content: Thought content normal.         Cognition and Memory: Cognition normal.         Judgment: Judgment normal.         Vitals:  not currently breastfeeding. There is no height or weight on file to calculate BMI.  Due to extenuating circumstances and possible current health risks associated with the patient being present in a clinical setting (with current health restrictions in place in regards to possible COVID 19 transmission/exposure), the patient was seen remotely today via a MyChart Video Visit through [facility] and telephone encounter.  Unable to obtain vital signs due to nature of remote visit.  Height stated at 66 inches.  Weight stated at 179 pounds.    Last 3 Blood Pressure Readings:  BP Readings from Last 3 Encounters:   02/26/25 124/74   02/26/24 124/76   01/17/24 120/70          Mental Status Exam:   Hygiene:   good  Cooperation:  Cooperative  Eye Contact:  Good  Psychomotor Behavior:  Appropriate  Affect:  Full range  Mood: euthymic  Hopelessness: Denies  Speech:  Normal  Thought Process:  Goal directed and Linear  Thought Content:   Normal  Suicidal:  None  Homicidal:  None  Hallucinations:  None  Delusion:  None  Memory:   Reports can be forgetful  Orientation:  Person, Place, Time, and Situation  Reliability:  good  Insight:  Good  Judgement:  Good  Impulse Control:  Good  Physical/Medical Issues:  No        Lab Results:   No visits with results within 1 Month(s) from this visit.   Latest known visit with results is:   Orders Only on 02/27/2025   Component Date Value Ref Range Status    WBC 02/27/2025 5.35  3.40 - 10.80 10*3/mm3 Final    RBC 02/27/2025 4.70  3.77 - 5.28 10*6/mm3 Final    Hemoglobin 02/27/2025 14.2  12.0 - 15.9 g/dL Final    Hematocrit 02/27/2025 43.2  34.0 - 46.6 % Final    MCV 02/27/2025 91.9  79.0 - 97.0 fL Final    MCH 02/27/2025 30.2  26.6 - 33.0 pg Final    MCHC 02/27/2025 32.9  31.5 - 35.7 g/dL Final    RDW 02/27/2025 11.6 (L)  12.3 - 15.4 % Final    Platelets 02/27/2025 227  140 - 450 10*3/mm3 Final    Neutrophil Rel % 02/27/2025 57.6  42.7 - 76.0 % Final    Lymphocyte Rel % 02/27/2025 31.2  19.6 - 45.3 % Final    Monocyte Rel % 02/27/2025 9.3  5.0 - 12.0 % Final    Eosinophil Rel % 02/27/2025 1.1  0.3 - 6.2 % Final    Basophil Rel % 02/27/2025 0.6  0.0 - 1.5 % Final    Neutrophils Absolute 02/27/2025 3.08  1.70 - 7.00 10*3/mm3 Final    Lymphocytes Absolute 02/27/2025 1.67  0.70 - 3.10 10*3/mm3 Final    Monocytes Absolute 02/27/2025 0.50  0.10 - 0.90 10*3/mm3 Final    Eosinophils Absolute 02/27/2025 0.06  0.00 - 0.40 10*3/mm3 Final    Basophils Absolute 02/27/2025 0.03  0.00 - 0.20 10*3/mm3 Final    Immature Granulocyte Rel % 02/27/2025 0.2  0.0 - 0.5 % Final    Immature Grans Absolute 02/27/2025 0.01  0.00 - 0.05 10*3/mm3 Final    nRBC 02/27/2025 0.0  0.0 - 0.2 /100 WBC Final    Glucose 02/27/2025 82  65 - 99 mg/dL Final    BUN 02/27/2025 12  6 - 20 mg/dL Final    Creatinine 02/27/2025 1.02 (H)  0.57 - 1.00 mg/dL Final    EGFR Result 02/27/2025 71.0  >60.0 mL/min/1.73 Final    Comment: GFR Categories in Chronic Kidney  Disease (CKD)/X09/  /X09/  GFR Category          GFR (mL/min/1.73)    Interpretation  G1/X09/                    90 or greater/X09/        Normal or high  (1)  G2//                    60-89                Mild decrease  (1)  G3a                   45-59                Mild to moderate  decrease  G3b                   30-44                Moderate to  severe decrease  G4                    15-29                Severe decrease  G5                    14 or less           Kidney failure//  /[id number]/  (1)In the absence of evidence of kidney disease, neither  GFR category G1 or G2 fulfill the criteria for CKD.  eGFR calculation 2021 CKD-EPI creatinine equation, which  does not include race as a factor      BUN/Creatinine Ratio 02/27/2025 11.8  7.0 - 25.0 Final    Sodium 02/27/2025 138  136 - 145 mmol/L Final    Potassium 02/27/2025 4.9  3.5 - 5.2 mmol/L Final    Chloride 02/27/2025 104  98 - 107 mmol/L Final    Total CO2 02/27/2025 25.2  22.0 - 29.0 mmol/L Final    Calcium 02/27/2025 9.2  8.6 - 10.5 mg/dL Final    Total Protein 02/27/2025 6.6  6.0 - 8.5 g/dL Final    Albumin 02/27/2025 4.0  3.5 - 5.2 g/dL Final    Globulin 02/27/2025 2.6  gm/dL Final    A/G Ratio 02/27/2025 1.5  g/dL Final    Total Bilirubin 02/27/2025 0.5  0.0 - 1.2 mg/dL Final    Alkaline Phosphatase 02/27/2025 66  39 - 117 U/L Final    AST (SGOT) 02/27/2025 14  1 - 32 U/L Final    ALT (SGPT) 02/27/2025 16  1 - 33 U/L Final    Total Cholesterol 02/27/2025 183  0 - 200 mg/dL Final    Comment: Cholesterol Reference Ranges  (U.S. Department of Health and Human Services ATP III  Classifications)  Desirable          <200 mg/dL  Borderline High    200-239 mg/dL  High Risk          >240 mg/dL  Triglyceride Reference Ranges  (U.S. Department of Health and Human Services ATP III  Classifications)  Normal           <150 mg/dL  Borderline High  150-199 mg/dL  High             200-499 mg/dL  Very High        >500 mg/dL  HDL Reference Ranges  (U.S.  Department of Health and Human Services ATP III  Classifications)  Low     <40 mg/dl (major risk factor for CHD)  High    >60 mg/dl ('negative' risk factor for CHD)  LDL Reference Ranges  (U.S. Department of Health and Human Services ATP III  Classifications)  Optimal          <100 mg/dL  Near Optimal     100-129 mg/dL  Borderline High  130-159 mg/dL  High             160-189 mg/dL  Very High        >189 mg/dL  LDL is calculated using the NIH LDL-C calculation.      Triglycerides 02/27/2025 82  0 - 150 mg/dL Final    HDL Cholesterol 02/27/2025 43  40 - 60 mg/dL Final    VLDL Cholesterol Dennis 02/27/2025 15  5 - 40 mg/dL Final    LDL Chol Calc (NIH) 02/27/2025 125 (H)  0 - 100 mg/dL Final    TSH 02/27/2025 1.630  0.270 - 4.200 uIU/mL Final                Assessment & Plan   Diagnoses and all orders for this visit:    1. ADHD (attention deficit hyperactivity disorder), inattentive type (Primary)  -     amphetamine-dextroamphetamine XR (ADDERALL XR) 25 MG 24 hr capsule; Take 1 capsule by mouth Daily  Dispense: 30 capsule; Refill: 0  -     buPROPion XL (Wellbutrin XL) 300 MG 24 hr tablet; Take 1 tablet by mouth Every Morning.  Dispense: 90 tablet; Refill: 3    2. Generalized anxiety disorder  -     buPROPion XL (Wellbutrin XL) 300 MG 24 hr tablet; Take 1 tablet by mouth Every Morning.  Dispense: 90 tablet; Refill: 3          Visit Diagnoses:    ICD-10-CM ICD-9-CM   1. ADHD (attention deficit hyperactivity disorder), inattentive type  F90.0 314.00   2. Generalized anxiety disorder  F41.1 300.02           GOALS:  Short Term Goals: Patient will be compliant with medication, and patient will have no significant medication related side effects.  Patient will be engaged in psychotherapy as indicated.  Patient will report subjective improvement of symptoms.  Long term goals: To stabilize mood and treat/improve subjective symptoms, the patient will stay out of the hospital, the patient will be at an optimal level of functioning,  "and the patient will take all medications as prescribed.  The patient/guardian verbalized understanding and agreement with goals that were mutually set.    RISK ASSESSMENT  Current harm-to-self risk is reported by pt as \"none.\"  Current onml-fq-hepfdy risk is reported by pt as \"none.\"   No suicidal thoughts, intent, plan is appreciated by this provider on this date of exam.   No homicidal thoughts, intent, plan is appreciated by this provider on this date.    TREATMENT PLAN: Continue supportive psychotherapy efforts and medications as indicated.   Pharmacological and Non-Pharmacological treatment options discussed during today's visit. Patient/Guardian acknowledged and verbally consented with current treatment plan and was educated on the importance of compliance with treatment and follow-up appointments.      MEDICATION ISSUES:  Discussed medication options and treatment plan of prescribed medication as well as the risks, benefits, any black box warnings, and side effects including potential falls, possible impaired driving, and metabolic adversities among others. Patient is agreeable to call the office with any worsening of symptoms or onset of side effects, or if any concerns or questions arise.  The contact information for the office is made available to the patient. Patient is agreeable to call 911 or go to the nearest ER should they begin having any SI/HI, or if any urgent concerns arise. No medication side effects or related complaints today.     Increase Adderall xr to 25mg daily  Continue with lexapro 10mg daily  Continue with Wellbutrin 300mgxl every morning, monitor b/p       MEDS ORDERED DURING VISIT:  New Medications Ordered This Visit   Medications    amphetamine-dextroamphetamine XR (ADDERALL XR) 25 MG 24 hr capsule     Sig: Take 1 capsule by mouth Daily     Dispense:  30 capsule     Refill:  0    buPROPion XL (Wellbutrin XL) 300 MG 24 hr tablet     Sig: Take 1 tablet by mouth Every Morning.     " Dispense:  90 tablet     Refill:  3       Follow Up Appointment:   Return in about 2 months (around 7/15/2025) for Recheck, Video visit.               This document has been electronically signed by KESHIA Balderas  May 15, 2025 09:26 EDT    Dictated Utilizing Dragon Dictation: Part of this note may be an electronic transcription/translation of spoken language to printed text using the Dragon Dictation System.

## 2025-06-04 DIAGNOSIS — F90.0 ADHD (ATTENTION DEFICIT HYPERACTIVITY DISORDER), INATTENTIVE TYPE: ICD-10-CM

## 2025-06-04 RX ORDER — DEXTROAMPHETAMINE SACCHARATE, AMPHETAMINE ASPARTATE MONOHYDRATE, DEXTROAMPHETAMINE SULFATE AND AMPHETAMINE SULFATE 6.25; 6.25; 6.25; 6.25 MG/1; MG/1; MG/1; MG/1
25 CAPSULE, EXTENDED RELEASE ORAL EVERY MORNING
Qty: 30 CAPSULE | Refills: 0 | Status: SHIPPED | OUTPATIENT
Start: 2025-06-04

## 2025-06-04 NOTE — TELEPHONE ENCOUNTER
Last fill was 05/15/2025. Cannot refill until 06/13 or 06/14. I can send in the order but pharmacy won't fill until due.

## 2025-06-24 ENCOUNTER — TELEPHONE (OUTPATIENT)
Dept: PSYCHIATRY | Facility: CLINIC | Age: 40
End: 2025-06-24
Payer: COMMERCIAL

## 2025-06-24 NOTE — TELEPHONE ENCOUNTER
Left message for patient to call office in regards to refill request for the Adderall Xr script was sent into pharmacy on 6/4/25

## 2025-07-15 ENCOUNTER — TELEMEDICINE (OUTPATIENT)
Dept: PSYCHIATRY | Facility: CLINIC | Age: 40
End: 2025-07-15
Payer: COMMERCIAL

## 2025-07-15 DIAGNOSIS — F41.1 GENERALIZED ANXIETY DISORDER: ICD-10-CM

## 2025-07-15 DIAGNOSIS — F90.0 ADHD (ATTENTION DEFICIT HYPERACTIVITY DISORDER), INATTENTIVE TYPE: Primary | ICD-10-CM

## 2025-07-15 DIAGNOSIS — Z51.81 MEDICATION MONITORING ENCOUNTER: ICD-10-CM

## 2025-07-15 NOTE — PATIENT INSTRUCTIONS
For concerns or needing assistance call the [facility] at [phone]  Controlled Substance Agreement sent to [facility] for signature  Continue Adderall XR 25 mg every morning  Continue with lexapro 10mg daily  Continue with Wellbutrin 300mgxl every morning, monitor b/p   Medication refills:- Refill requests are addressed M-Th. No refills will be sent in on Fridays, weekends or federal holidays.   Please be aware of your need for refills and call one week before needing medication refilled so it can be filled in a timely manner.

## 2025-07-15 NOTE — PROGRESS NOTES
" Mode of Visit: Video  Location of patient: -WORK-  Location of provider: +HOME+  You have chosen to receive care through a telehealth visit.  The patient has signed the video visit consent form.  The visit included audio and video interaction. No technical issues occurred during this visit.  Patient verbally confirmed consent for today's encounter  07/15/2025      Subjective   Bren Lynch is a 40 y.o. 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  Patient reported above chief complaint.  States diagnosed with attention deficit disorder by pediatric psychology when she was in elementary school.  She was on medication but not sure what it was, possibly Ritalin.  She only took it for a couple years and then was not on medication but was an average student.  Struggled in math.  No history of hyperactivity.  No history of adverse childhood experiences, reports childhood was without trauma.  Patient reports as she is getting older she is finding it more difficult to stay on task, she gets easily distracted, difficulty with concentration and focus and follow through on her task.  This has affected her work quality.     Patient does report history of some anxiety and has been on Lexapro for a while and reports this has been helpful.  LUANNE-7 today is scored at 0, previously scored at 0.  PHQ-9 score  today at 0, previously at 1.      Most recent encounter was 2 months ago.  Patient reported at that encounter that she was still having to write down a lot of her notes, her desk is where there are a lot of distractions and there is nowhere else for her to go.  Her desk is located in a high traffic area.  We increased the Adderall from 20 mg XR to 25 mg XR at that time.  Patient reports today that she feels like the " increase in the medication has been somewhat helpful.  Work can still be stressful, but she works in a busy office.  There also have been people out on vacation so she has had to do more tasks to cover those individuals while they are out.  No side effects are reported.        Continues with the Wellbutrin at 300 XL.  Reports mood overall is stable and she feels like the Wellbutrin also helps with focus and concentration.  Reminded patient to sign the controlled substance agreement which was sent at last encounter in May.  I will send this again today.     PHQ-9 Depression Screening  Little interest or pleasure in doing things? (Patient-Rptd) Not at all   Feeling down, depressed, or hopeless? (Patient-Rptd) Not at all   PHQ-2 Total Score (Patient-Rptd) 0   Trouble falling or staying asleep, or sleeping too much? (Patient-Rptd) Not at all   Feeling tired or having little energy? (Patient-Rptd) Not at all   Poor appetite or overeating? (Patient-Rptd) Not at all   Feeling bad about yourself - or that you are a failure or have let yourself or your family down? (Patient-Rptd) Not at all   Trouble concentrating on things, such as reading the newspaper or watching television? (Patient-Rptd) Not at all   Moving or speaking so slowly that other people could have noticed? Or the opposite - being so fidgety or restless that you have been moving around a lot more than usual? (Patient-Rptd) Not at all   Thoughts that you would be better off dead, or of hurting yourself in some way? (Patient-Rptd) Not at all   PHQ-9 Total Score (Patient-Rptd) 0   If you checked off any problems, how difficult have these problems made it for you to do your work, take care of things at home, or get along with other people? (Patient-Rptd) Not difficult at all           LUANNE-7  Feeling nervous, anxious or on edge: (Patient-Rptd) Not at all  Not being able to stop or control worrying: (Patient-Rptd) Not at all  Worrying too much about different things:  (Patient-Rptd) Not at all  Trouble Relaxing: (Patient-Rptd) Not at all  Being so restless that it is hard to sit still: (Patient-Rptd) Not at all  Feeling afraid as if something awful might happen: (Patient-Rptd) Not at all  Becoming easily annoyed or irritable: (Patient-Rptd) Not at all  LUANNE 7 Total Score: (Patient-Rptd) 0  If you checked any problems, how difficult have these problems made it for you to do your work, take care of things at home, or get along with other people: (Patient-Rptd) Not difficult at all    Last Menstrual Period:  Mirena IUD, not currently having menses.    The following portions of the patient's history were reviewed and updated as appropriate: allergies, current medications, past family history, past medical history, past social history, past surgical history and problem list.    Past Psychiatric History:  Began Treatment:as a child in elementary school  Diagnoses:Anxiety and ADHD  Psychiatrist:saw provider at pediatric psychology, Dr. Al Diaz  Therapist:therapy when getting   Admission History:Denies  Medication Trials:unsure, thinks ritalin; zoloft gave somnolence. Has been on Wellbutrin for about 6 mos, currently at 300mg XL daily as of 02/15/2024. For ADHD. Not effective but has had some benefit for mood. Lexapro for some time for anxiety.   Self Harm: Denies  Suicide Attempts:Denies   Psychosis, Anxiety, Depression: Denies  Denies any history of a head injury or seizure.  Denies any history of psychosis or hallucinations.    Past Medical History:  Past Medical History:   Diagnosis Date    ADHD (attention deficit hyperactivity disorder)     I was diagnosed in 2nd grade    Anxiety     Anxiety/Stress    Depression        Substance Abuse History:   Types:Denies all, including illicit        Social History:  Social History     Socioeconomic History    Marital status:     Number of children: 2    Years of education: 12    Highest education level: Some  college, no degree   Tobacco Use    Smoking status: Never     Passive exposure: Never    Smokeless tobacco: Never   Vaping Use    Vaping status: Never Used   Substance and Sexual Activity    Alcohol use: No    Drug use: No    Sexual activity: Not Currently     Partners: Male     Birth control/protection: I.U.D.     Comment: Mirena 2017   Patient reports support system consists of her sister and her father.  Patient has been  and  one time.  [place] preference is [employer], denied any history of legal problems.  Denied any history of  service.    Family History:  Family History   Problem Relation Age of Onset    Breast cancer Mother 58    Hypertension Mother     Hyperlipidemia Mother     Liver cancer Mother 65    Bone cancer Mother 65    Anxiety disorder Mother     Hypertension Father     Hyperlipidemia Father     Lung cancer Maternal Uncle 56    Brain cancer Maternal Uncle 56    Bone cancer Maternal Uncle 56    Stroke Maternal Grandfather     Lung cancer Maternal Grandfather     Emphysema Maternal Grandfather     Osteoporosis Maternal Grandmother     Dementia Maternal Grandmother     Stroke Paternal Grandfather     Prostate cancer Paternal Grandfather     Uterine cancer Paternal Grandmother 60    Cleft palate Cousin     ADD / ADHD Cousin     Mental retardation Cousin     Von Willebrand disease Cousin     Anxiety disorder Son     Ovarian cancer Neg Hx     Colon cancer Neg Hx     Melanoma Neg Hx        Past Surgical History:  Past Surgical History:   Procedure Laterality Date    ABDOMINAL SURGERY      C-SEC     SECTION       SECTION      TONSILLECTOMY  1986       Problem List:  Patient Active Problem List   Diagnosis    IUD (intrauterine device) in place    Family history of breast cancer       Allergy:   No Known Allergies     Current Medications:   Current Outpatient Medications   Medication Sig Dispense Refill    amphetamine-dextroamphetamine XR (ADDERALL  XR) 25 MG 24 hr capsule Take 1 capsule by mouth Every Morning 30 capsule 0    buPROPion XL (Wellbutrin XL) 300 MG 24 hr tablet Take 1 tablet by mouth Every Morning. 90 tablet 3    cetirizine (zyrTEC) 10 MG tablet Take 1 tablet by mouth Daily.      escitalopram (LEXAPRO) 10 MG tablet Take 1 tablet by mouth Daily. 90 tablet 3    Levonorgestrel (Mirena, 52 MG,) 20 MCG/DAY intrauterine device IUD To be inserted one time by prescriber. Route intrauterine. 1 each 0     No current facility-administered medications for this visit.        Physical Exam:   Physical Exam  Vitals reviewed.   Constitutional:       Appearance: Normal appearance. She is well-developed and well-groomed.   HENT:      Head: Normocephalic.   Neurological:      Mental Status: She is alert.   Psychiatric:         Attention and Perception: Attention and perception normal.         Mood and Affect: Mood and affect normal.         Speech: Speech normal.         Behavior: Behavior normal. Behavior is cooperative.         Thought Content: Thought content normal.         Cognition and Memory: Cognition normal.         Judgment: Judgment normal.         Vitals:  not currently breastfeeding. There is no height or weight on file to calculate BMI.  Due to extenuating circumstances and possible current health risks associated with the patient being present in a clinical setting (with current health restrictions in place in regards to possible COVID 19 transmission/exposure), the patient was seen remotely today via a MyChart Video Visit through [facility] and telephone encounter.  Unable to obtain vital signs due to nature of remote visit.  Height stated at 66 inches.  Weight stated at 179 pounds.    Last 3 Blood Pressure Readings:  BP Readings from Last 3 Encounters:   02/26/25 124/74   02/26/24 124/76   01/17/24 120/70          Mental Status Exam:   Hygiene:   good  Cooperation:  Cooperative  Eye Contact:  Good  Psychomotor Behavior:  Appropriate  Affect:  Full  "range  Mood: euthymic  Hopelessness: Denies  Speech:  Normal  Thought Process:  Goal directed and Linear  Thought Content:  Normal  Suicidal:  None  Homicidal:  None  Hallucinations:  None  Delusion:  None  Memory:  Reports can be forgetful  Orientation:  Person, Place, Time, and Situation  Reliability:  good  Insight:  Good  Judgement:  Good  Impulse Control:  Good  Physical/Medical Issues:  No        Lab Results:   No new labs since prior encounter             Assessment & Plan   Diagnoses and all orders for this visit:    1. ADHD (attention deficit hyperactivity disorder), inattentive type (Primary)    2. Generalized anxiety disorder    3. Medication monitoring encounter            Visit Diagnoses:    ICD-10-CM ICD-9-CM   1. ADHD (attention deficit hyperactivity disorder), inattentive type  F90.0 314.00   2. Generalized anxiety disorder  F41.1 300.02   3. Medication monitoring encounter  Z51.81 V58.83             GOALS:  Short Term Goals: Patient will be compliant with medication, and patient will have no significant medication related side effects.  Patient will be engaged in psychotherapy as indicated.  Patient will report subjective improvement of symptoms.  Long term goals: To stabilize mood and treat/improve subjective symptoms, the patient will stay out of the hospital, the patient will be at an optimal level of functioning, and the patient will take all medications as prescribed.  The patient/guardian verbalized understanding and agreement with goals that were mutually set.    RISK ASSESSMENT  Current harm-to-self risk is reported by pt as \"none.\"  Current [id number] risk is reported by pt as \"none.\"   No suicidal thoughts, intent, plan is appreciated by this provider on this date of exam.   No homicidal thoughts, intent, plan is appreciated by this provider on this date.    TREATMENT PLAN: Continue supportive psychotherapy efforts and medications as indicated.   Pharmacological and Non-Pharmacological " treatment options discussed during today's visit. Patient/Guardian acknowledged and verbally consented with current treatment plan and was educated on the importance of compliance with treatment and follow-up appointments.      MEDICATION ISSUES:  Discussed medication options and treatment plan of prescribed medication as well as the risks, benefits, any black box warnings, and side effects including potential falls, possible impaired driving, and metabolic adversities among others. Patient is agreeable to call the office with any worsening of symptoms or onset of side effects, or if any concerns or questions arise.  The contact information for the office is made available to the patient. Patient is agreeable to call 911 or go to the nearest ER should they begin having any SI/HI, or if any urgent concerns arise. No medication side effects or related complaints today.     Continue Adderall XR 25 mg every morning  Continue with lexapro 10mg daily  Continue with Wellbutrin 300mgxl every morning, monitor b/p   Medication refills:- Refill requests are addressed M-Th. No refills will be sent in on Fridays, weekends or federal holidays.   Please be aware of your need for refills and call one week before needing medication refilled so it can be filled in a timely manner.       MEDS ORDERED DURING VISIT:  No orders of the defined types were placed in this encounter.      Follow Up Appointment:   Return in about 13 weeks (around 10/14/2025) for Recheck, Video visit.               This document has been electronically signed by KESHIA Balderas  July 15, 2025 08:13 EDT    Dictated Utilizing Dragon Dictation: Part of this note may be an electronic transcription/translation of spoken language to printed text using the Dragon Dictation System.

## 2025-08-06 DIAGNOSIS — F90.0 ADHD (ATTENTION DEFICIT HYPERACTIVITY DISORDER), INATTENTIVE TYPE: ICD-10-CM

## 2025-08-06 RX ORDER — DEXTROAMPHETAMINE SACCHARATE, AMPHETAMINE ASPARTATE MONOHYDRATE, DEXTROAMPHETAMINE SULFATE AND AMPHETAMINE SULFATE 6.25; 6.25; 6.25; 6.25 MG/1; MG/1; MG/1; MG/1
25 CAPSULE, EXTENDED RELEASE ORAL EVERY MORNING
Qty: 30 CAPSULE | Refills: 0 | Status: SHIPPED | OUTPATIENT
Start: 2025-08-06